# Patient Record
Sex: MALE | Race: WHITE | Employment: UNEMPLOYED | URBAN - METROPOLITAN AREA
[De-identification: names, ages, dates, MRNs, and addresses within clinical notes are randomized per-mention and may not be internally consistent; named-entity substitution may affect disease eponyms.]

---

## 2018-11-07 ENCOUNTER — HOSPITAL ENCOUNTER (OUTPATIENT)
Dept: ULTRASOUND IMAGING | Age: 48
Discharge: HOME | End: 2018-11-07
Payer: COMMERCIAL

## 2018-11-07 DIAGNOSIS — E04.1 NONTOXIC UNINODULAR GOITER: ICD-10-CM

## 2018-11-07 PROCEDURE — 76536 US EXAM OF HEAD AND NECK: CPT

## 2019-08-20 ENCOUNTER — HOSPITAL ENCOUNTER (OUTPATIENT)
Dept: MRI IMAGING | Age: 49
Discharge: HOME | End: 2019-08-20
Payer: COMMERCIAL

## 2019-08-20 DIAGNOSIS — Z98.890 HX OF CERVICAL SPINE SURGERY: ICD-10-CM

## 2019-08-20 DIAGNOSIS — M54.2 CERVICALGIA: ICD-10-CM

## 2019-08-20 DIAGNOSIS — M50.30 DDD (DEGENERATIVE DISC DISEASE), CERVICAL: ICD-10-CM

## 2019-08-20 PROCEDURE — 72141 MRI NECK SPINE W/O DYE: CPT

## 2019-12-17 ENCOUNTER — HOSPITAL ENCOUNTER (OUTPATIENT)
Dept: MRI IMAGING | Age: 49
Discharge: HOME | End: 2019-12-17
Payer: COMMERCIAL

## 2019-12-17 DIAGNOSIS — R51.9 CHRONIC DAILY HEADACHE: ICD-10-CM

## 2019-12-17 PROCEDURE — 70553 MRI BRAIN STEM W/O & W/DYE: CPT

## 2019-12-17 RX ORDER — GADOTERATE MEGLUMINE 376.9 MG/ML
18 INJECTION INTRAVENOUS
Status: COMPLETED | OUTPATIENT
Start: 2019-12-17 | End: 2019-12-17

## 2019-12-17 RX ADMIN — GADOTERATE MEGLUMINE 18 ML: 376.9 INJECTION INTRAVENOUS at 14:00

## 2020-01-07 ENCOUNTER — OFFICE VISIT (OUTPATIENT)
Dept: NEUROLOGY | Age: 50
End: 2020-01-07

## 2020-01-07 VITALS
HEIGHT: 70 IN | HEART RATE: 72 BPM | WEIGHT: 196 LBS | DIASTOLIC BLOOD PRESSURE: 78 MMHG | BODY MASS INDEX: 28.06 KG/M2 | RESPIRATION RATE: 12 BRPM | SYSTOLIC BLOOD PRESSURE: 110 MMHG | OXYGEN SATURATION: 97 %

## 2020-01-07 DIAGNOSIS — G44.86 CERVICOGENIC HEADACHE: ICD-10-CM

## 2020-01-07 DIAGNOSIS — G44.209 MIXED COMMON MIGRAINE AND MUSCLE CONTRACTION HEADACHE: ICD-10-CM

## 2020-01-07 DIAGNOSIS — I67.89 CEREBRAL MICROVASCULAR DISEASE: ICD-10-CM

## 2020-01-07 DIAGNOSIS — I65.23 BILATERAL CAROTID ARTERY STENOSIS: ICD-10-CM

## 2020-01-07 DIAGNOSIS — G43.009 MIXED COMMON MIGRAINE AND MUSCLE CONTRACTION HEADACHE: ICD-10-CM

## 2020-01-07 DIAGNOSIS — M96.1 CERVICAL POST-LAMINECTOMY SYNDROME: Primary | ICD-10-CM

## 2020-01-07 RX ORDER — PROPRANOLOL HYDROCHLORIDE 60 MG/1
60 CAPSULE, EXTENDED RELEASE ORAL DAILY
COMMUNITY
Start: 2019-08-06 | End: 2022-06-07 | Stop reason: ALTCHOICE

## 2020-01-07 RX ORDER — ASPIRIN 81 MG/1
TABLET ORAL DAILY
COMMUNITY
End: 2021-08-11

## 2020-01-07 RX ORDER — TOPIRAMATE 25 MG/1
25 TABLET ORAL 2 TIMES DAILY
COMMUNITY
Start: 2019-11-11 | End: 2020-01-07 | Stop reason: ALTCHOICE

## 2020-01-07 RX ORDER — RIZATRIPTAN BENZOATE 10 MG/1
10 TABLET ORAL
Qty: 12 TAB | Refills: 11 | Status: SHIPPED | OUTPATIENT
Start: 2020-01-07 | End: 2022-04-05 | Stop reason: SDUPTHER

## 2020-01-07 RX ORDER — SIMVASTATIN 10 MG/1
10 TABLET, FILM COATED ORAL
COMMUNITY
Start: 2019-12-30

## 2020-01-07 RX ORDER — LANOLIN ALCOHOL/MO/W.PET/CERES
400 CREAM (GRAM) TOPICAL DAILY
COMMUNITY
End: 2021-08-11

## 2020-01-07 RX ORDER — DULOXETIN HYDROCHLORIDE 30 MG/1
30 CAPSULE, DELAYED RELEASE ORAL DAILY
COMMUNITY
Start: 2019-11-10 | End: 2021-08-11

## 2020-01-07 RX ORDER — CHOLECALCIFEROL (VITAMIN D3) 125 MCG
1500 CAPSULE ORAL DAILY
COMMUNITY

## 2020-01-07 NOTE — LETTER
1/7/20 Patient: Amanda Becker YOB: 1970 Date of Visit: 1/7/2020 Addie Nicole MD 
[address] [place] [address] VIA Facsimile: [phone] Dear Addie Nicole MD, Thank you for referring Mr. Sarah Pate to [facility] for evaluation. My notes for this consultation are attached. Consult REFERRED BY: 
Trey Almeida MD 
 
CHIEF COMPLAINT: Progressive headaches since early summer last year Subjective:  
 
Amanda Becker is a 52 y.o. male seen as a new patient to me, at the request of Dr. Jake De Paz for evaluation of new problem of progressive and severe headaches since early summer of this year, that were bitemporal, described as pressure headaches, associate with occasional nausea but not much vomiting, and no other focal neurologic signs but marked tightness in his neck, and the headaches seem to be more in the posterior head regions and in the bitemporal and frontal head regions. They are described as a pressure sensation that occasionally became throbbing but more severe. He was placed on Inderal 60 mg a day which seems to have significantly helped his headaches and also placed on Topamax 25 mg to times a day that also seems to help and his headaches are much better now and they are relieved by his medications of Maxalt which he takes on a as needed basis, but does complain about the sublingual form and the bitter taste in his mouth, so we switched him over to regular pill form to avoid that. Patient had an MRI scan done December 2019 that was basically normal aside from minimal white matter disease, and because of that we will get a carotid Doppler study to make sure there is no vascular insufficiency, but on review the MRI scan which I did personally myself, there are only 1 or 2 spots that look like typical things we see with vascular headaches.   We will switch him from Topamax 25 mg twice a day to Trokendi 50 mg once a day since he is tolerating the medication well wants to simplify his medications. He also uses magnesium 400 mg a day which can work as a headache preventative, and also takes vitamin D 2000 units a day which may help some also. He takes fish oil and super B complex and multivitamin every day and takes Zocor 10 mg a day for his cholesterol, and Cymbalta already 30 mg a day which may also help with his headaches. He looks a little stressed and chronically depressed. He has had no fever, no meningismus, no trauma, but does have a lot of stress. He has had 3 cervical fusions and cervical laminectomies in the past a lot of his headaches begin in the neck region, I suggested he do a range of motion to his neck to help keep the muscles loose, and he probably has cervicogenic headaches. His last fusion was in 2016. Past Medical History:  
Diagnosis Date  Back pain  Hypercholesteremia  Migraine 2009  Neck pain Past Surgical History:  
Procedure Laterality Date  HX APPENDECTOMY  1990  
 HX CERVICAL LAMINECTOMY  HX CERVICAL LAMINECTOMY  2016  HX ORTHOPAEDIC Neck and back surgeries: 2008, 2014, 2016  HX TUMOR REMOVAL  2003 Fatty tumors Family History Problem Relation Age of Onset  Cancer Mother   
     uterine  Other Father ALS  Stroke Maternal Grandfather  Cancer Maternal Grandfather  Dementia Paternal Grandmother  Dementia Paternal Grandfather  Cancer Maternal Grandmother   
     breast  
  
Social History Tobacco Use  Smoking status: Never Smoker  Smokeless tobacco: Never Used Substance Use Topics  Alcohol use: Not Currently Current Outpatient Medications:  
  multivit with min-folic acid (ADULT MULTIVITAMIN GUMMIES) 200 mcg chew, Take  by mouth daily. , Disp: , Rfl:  
  propranolol LA (INDERAL LA) 60 mg SR capsule, TAKE 1 CAPSULE BY MOUTH EVERY DAY, Disp: , Rfl:  
  DULoxetine (CYMBALTA) 30 mg capsule, 30 mg daily. , Disp: , Rfl:  
  simvastatin (ZOCOR) 10 mg tablet, 10 mg nightly., Disp: , Rfl:  
  omega 3-dha-epa-fish oil 250-350-1,000 mg cap, Take 2 Caps by mouth daily. , Disp: , Rfl:  
  magnesium oxide (MAG-OX) 400 mg tablet, Take 400 mg by mouth daily. , Disp: , Rfl:  
  cholecalciferol, vitamin D3, (VITAMIN D3) 2,000 unit tab, Take  by mouth daily. , Disp: , Rfl:  
  topiramate ER (TROKENDI XR) 50 mg capsule, Take 1 Cap by mouth daily. , Disp: 90 Cap, Rfl: 4   rizatriptan (MAXALT) 10 mg tablet, Take 1 Tab by mouth every eight (8) hours as needed for Migraine. May repeat in 2 hours if needed, Disp: 12 Tab, Rfl: 11 
  aspirin delayed-release 81 mg tablet, Take  by mouth daily. , Disp: , Rfl:  
 
 
 
Allergies Allergen Reactions  Contrast Dye [Iodine] Hives MRI Results (most recent): 
Results from Hospital Encounter encounter on 12/17/19 MRI BRAIN W WO CONT Narrative EXAM:  MRI BRAIN W WO CONT INDICATION:    Chronic daily headache COMPARISON:  None. CONTRAST: 18 ml Dotarem. TECHNIQUE:   
Multiplanar multisequence acquisition without and with contrast of the brain. FINDINGS: 
The ventricles are normal in size and position. Minimal (less than 5) scattered 
nonspecific T2/FLAIR white matter hyperintensities, of doubtful clinical 
significance. The brain parenchyma otherwise has normal signal characteristics. There is no acute infarct, hemorrhage, extra-axial fluid collection, or mass 
effect. There is no cerebellar tonsillar herniation. Expected arterial 
flow-voids are present. No evidence of abnormal enhancement. Small retention cyst in the right maxillary sinus. The remaining paranasal 
sinuses, mastoid air cells and middle ears are clear. The orbital contents are 
within normal limits. No significant osseous or scalp lesions are identified.  
  
 Impression IMPRESSION:  
 
 1. No acute or significant intracranial abnormality. Results from Hospital Encounter encounter on 12/17/19 MRI BRAIN W WO CONT Narrative EXAM:  MRI BRAIN W WO CONT INDICATION:    Chronic daily headache COMPARISON:  None. CONTRAST: 18 ml Dotarem. TECHNIQUE:   
Multiplanar multisequence acquisition without and with contrast of the brain. FINDINGS: 
The ventricles are normal in size and position. Minimal (less than 5) scattered 
nonspecific T2/FLAIR white matter hyperintensities, of doubtful clinical 
significance. The brain parenchyma otherwise has normal signal characteristics. There is no acute infarct, hemorrhage, extra-axial fluid collection, or mass 
effect. There is no cerebellar tonsillar herniation. Expected arterial 
flow-voids are present. No evidence of abnormal enhancement. Small retention cyst in the right maxillary sinus. The remaining paranasal 
sinuses, mastoid air cells and middle ears are clear. The orbital contents are 
within normal limits. No significant osseous or scalp lesions are identified. Impression IMPRESSION:  
 
1. No acute or significant intracranial abnormality. Review of Systems: A comprehensive review of systems was negative except for: Musculoskeletal: positive for myalgias, arthralgias, stiff joints and neck pain Neurological: positive for headaches Behvioral/Psych: positive for anxiety and depression Vitals:  
 01/07/20 4710 BP: 110/78 Pulse: 72 Resp: 12 SpO2: 97% Weight: 196 lb (88.9 kg) Height: 5' 10\" (1.778 m) Objective: I 
 
 
NEUROLOGICAL EXAM: 
 
Appearance: The patient is well developed, well nourished, provides a coherent history and is in no acute distress. Patient has a well-healed anterior cervical laminectomy scar Mental Status: Oriented to time, place and person, and the president, cognitive function is normal and speech is fluent and no aphasia or dysarthria. Mood and affect appropriate but appears mildly depressed. Cranial Nerves:   Intact visual fields. Fundi are benign, disc are flat, no lesions seen on funduscopy. DANNI, EOM's full, no nystagmus, no ptosis. Facial sensation is normal. Corneal reflexes are not tested. Facial movement is symmetric. Hearing is normal bilaterally. Palate is midline with normal sternocleidomastoid and trapezius muscles are normal. Tongue is midline. Neck without meningismus or bruits, but does show very mild limitation of movement from his neck fusion Temporal arteries are not tender or enlarged TMJ areas are not tender on palpation Motor:  5/5 strength in upper and lower proximal and distal muscles. Normal bulk and tone. No fasciculations. Rapid alternating movement is symmetric and intact bilaterally Reflexes:   Deep tendon reflexes 2+/4 and symmetrical. 
No babinski or clonus present Sensory:   Normal to touch, pinprick and vibration and temperature. DSS is intact Gait:  Normal gait for patient's age. Tremor:   No tremor noted. Cerebellar:  No abnormal cerebellar signs present on Romberg and tandem testing and finger-nose-finger exam.  
Neurovascular:  Normal heart sounds and regular rhythm, peripheral pulses intact, and no carotid bruits. Assessment: ICD-10-CM ICD-9-CM 1. Cervical post-laminectomy syndrome M96.1 722.81 topiramate ER (TROKENDI XR) 50 mg capsule  
   rizatriptan (MAXALT) 10 mg tablet DUPLEX CAROTID BILATERAL 2. Cervicogenic headache R51 784.0 topiramate ER (TROKENDI XR) 50 mg capsule  
   rizatriptan (MAXALT) 10 mg tablet DUPLEX CAROTID BILATERAL 3. 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  Cerebral microvascular disease I67.9 437.9 DUPLEX CAROTID BILATERAL  
 5. Bilateral carotid artery stenosis I65.23 433.10 DUPLEX CAROTID BILATERAL  
  433.30 Active Problems: * No active hospital problems. * 
 
 
Plan:  
 
Patient's records, and MRI scans of the brain and cervical spine all personally reviewed on the PACS system myself, with the patient, and we went over these in detail with the patient, and I explained that most likely the white matter lesions in the brain secondary to his headaches, and that his cervical spine actually looks fairly good now adequately decompressed. Patient with cervicogenic headaches and probable muscle tension headaches, he is doing better on current beta-blocker 60 mg a day and he is to continue that, and continue his Topamax but in the form of Trokendi 50 mg once a day, and continue his Cymbalta 30 mg a day and his Maxalt was changed from sublingual form to pill form to avoid the bad taste. Patient is exam is relatively unremarkable except for his neck problem, and he had an MRI scan done in August 2019 that showed a mild C5-6 joint hypertrophy possibly affecting the right C6 nerve root with a fusion from C4-C7 but no real cord signal abnormality. Again his MRI of the brain in December 2019 just showed the minimal white matter disease he will get a Doppler for that. He is also encouraged take a baby aspirin every day which may help prevent some of the headaches and prevent any further white matter disease. We will check him again in 6 months time or earlier if need be. He will call if any problem in the interim He is also encouraged remain mentally and physically active, and continue his magnesium and regular exercise program to his neck. Signed By: Disha Bragg MD   
 January 7, 2020 CC: Emely Cleveland MD 
FAX: [phone] If you have questions, please do not hesitate to call me. I look forward to following your patient along with you. Sincerely, Disha Bragg MD

## 2020-01-07 NOTE — PATIENT INSTRUCTIONS
A Healthy Lifestyle: Care Instructions  Your Care Instructions    A healthy lifestyle can help you feel good, stay at a healthy weight, and have plenty of energy for both work and play. A healthy lifestyle is something you can share with your whole family. A healthy lifestyle also can lower your risk for serious health problems, such as high blood pressure, heart disease, and diabetes. You can follow a few steps listed below to improve your health and the health of your family. Follow-up care is a key part of your treatment and safety. Be sure to make and go to all appointments, and call your doctor if you are having problems. It's also a good idea to know your test results and keep a list of the medicines you take. How can you care for yourself at home? · Do not eat too much sugar, fat, or fast foods. You can still have dessert and treats now and then. The goal is moderation. · Start small to improve your eating habits. Pay attention to portion sizes, drink less juice and soda pop, and eat more fruits and vegetables. ? Eat a healthy amount of food. A 3-ounce serving of meat, for example, is about the size of a deck of cards. Fill the rest of your plate with vegetables and whole grains. ? Limit the amount of soda and sports drinks you have every day. Drink more water when you are thirsty. ? Eat at least 5 servings of fruits and vegetables every day. It may seem like a lot, but it is not hard to reach this goal. A serving or helping is 1 piece of fruit, 1 cup of vegetables, or 2 cups of leafy, raw vegetables. Have an apple or some carrot sticks as an afternoon snack instead of a candy bar. Try to have fruits and/or vegetables at every meal.  · Make exercise part of your daily routine. You may want to start with simple activities, such as walking, bicycling, or slow swimming. Try to be active 30 to 60 minutes every day. You do not need to do all 30 to 60 minutes all at once.  For example, you can exercise 3 times a day for 10 or 20 minutes. Moderate exercise is safe for most people, but it is always a good idea to talk to your doctor before starting an exercise program.  · Keep moving. Margy Limb the lawn, work in the garden, or [employer]. Take the stairs instead of the elevator at work. · If you smoke, quit. People who smoke have an increased risk for heart attack, stroke, cancer, and other lung illnesses. Quitting is hard, but there are ways to boost your chance of quitting tobacco for good. ? Use nicotine gum, patches, or lozenges. ? Ask your doctor about stop-smoking programs and medicines. ? Keep trying. In addition to reducing your risk of diseases in the future, you will notice some benefits soon after you stop using tobacco. If you have shortness of breath or asthma symptoms, they will likely get better within a few weeks after you quit. · Limit how much alcohol you drink. Moderate amounts of alcohol (up to 2 drinks a day for men, 1 drink a day for women) are okay. But drinking too much can lead to liver problems, high blood pressure, and other health problems. Family health  If you have a family, there are many things you can do together to improve your health. · Eat meals together as a family as often as possible. · Eat healthy foods. This includes fruits, vegetables, lean meats and dairy, and whole grains. · Include your family in your fitness plan. Most people think of activities such as jogging or tennis as the way to fitness, but there are many ways you and your family can be more active. Anything that makes you breathe hard and gets your heart pumping is exercise. Here are some tips:  ? Walk to do errands or to take your child to school or the bus.  ? Go for a family bike ride after dinner instead of watching TV. Where can you learn more? Go to http://daily-niki.info/. Enter F410 in the search box to learn more about \"A Healthy Lifestyle: Care Instructions. \"  Current as of: May 28, 2019  Content Version: 12.2  © 3866-2351 [employer]. Care instructions adapted under license by [employer] (which disclaims liability or warranty for this information). If you have questions about a medical condition or this instruction, always ask your healthcare professional. [address] any warranty or liability for your use of this information. Office Policies    o Phone calls/patient messages:  Please allow up to 24 hours for someone in the office to contact you about your call or message. Be mindful your provider may be out of the office or your message may require further review. We encourage you to use [employer] for your messages as this is a faster, more efficient way to communicate with our office    o Medication Refills:  Prescription medications require up to 48 business hours to process. We encourage you to use [employer] for your refills. For controlled medications: Please allow up to 72 business hours to process. Certain medications may require you to  a written prescription at our office. NO narcotic/controlled medications will be prescribed after 4pm Monday through Friday or on weekends    o Form/Paperwork Completion:  We ask that you allow 7-14 business days. You may also download your forms to [employer] to have your doctor print off.

## 2020-01-08 NOTE — PROGRESS NOTES
Consult  REFERRED BY:  Mariza Rodriguez MD    CHIEF COMPLAINT: Progressive headaches since early summer last year      Subjective:     Aliyah Mello is a 52 y.o. male seen as a new patient to me, at the request of Dr. Alida John for evaluation of new problem of progressive and severe headaches since early summer of this year, that were bitemporal, described as pressure headaches, associate with occasional nausea but not much vomiting, and no other focal neurologic signs but marked tightness in his neck, and the headaches seem to be more in the posterior head regions and in the bitemporal and frontal head regions. They are described as a pressure sensation that occasionally became throbbing but more severe. He was placed on Inderal 60 mg a day which seems to have significantly helped his headaches and also placed on Topamax 25 mg to times a day that also seems to help and his headaches are much better now and they are relieved by his medications of Maxalt which he takes on a as needed basis, but does complain about the sublingual form and the bitter taste in his mouth, so we switched him over to regular pill form to avoid that. Patient had an MRI scan done December 2019 that was basically normal aside from minimal white matter disease, and because of that we will get a carotid Doppler study to make sure there is no vascular insufficiency, but on review the MRI scan which I did personally myself, there are only 1 or 2 spots that look like typical things we see with vascular headaches. We will switch him from Topamax 25 mg twice a day to Trokendi 50 mg once a day since he is tolerating the medication well wants to simplify his medications. He also uses magnesium 400 mg a day which can work as a headache preventative, and also takes vitamin D 2000 units a day which may help some also.   He takes fish oil and super B complex and multivitamin every day and takes Zocor 10 mg a day for his cholesterol, and Cymbalta already 30 mg a day which may also help with his headaches. He looks a little stressed and chronically depressed. He has had no fever, no meningismus, no trauma, but does have a lot of stress. He has had 3 cervical fusions and cervical laminectomies in the past a lot of his headaches begin in the neck region, I suggested he do a range of motion to his neck to help keep the muscles loose, and he probably has cervicogenic headaches. His last fusion was in 2016. Past Medical History:   Diagnosis Date    Back pain     Hypercholesteremia     Migraine 2009    Neck pain       Past Surgical History:   Procedure Laterality Date    HX APPENDECTOMY  1990    HX CERVICAL LAMINECTOMY      HX CERVICAL LAMINECTOMY  2016    HX ORTHOPAEDIC      Neck and back surgeries: 2008, 2014, 2016    HX TUMOR REMOVAL  2003    Fatty tumors     Family History   Problem Relation Age of Onset    Cancer Mother         uterine    Other Father         ALS    Stroke Maternal Grandfather     Cancer Maternal Grandfather     Dementia Paternal Grandmother     Dementia Paternal Grandfather     Cancer Maternal Grandmother         breast      Social History     Tobacco Use    Smoking status: Never Smoker    Smokeless tobacco: Never Used   Substance Use Topics    Alcohol use: Not Currently         Current Outpatient Medications:     multivit with min-folic acid (ADULT MULTIVITAMIN GUMMIES) 200 mcg chew, Take  by mouth daily. , Disp: , Rfl:     propranolol LA (INDERAL LA) 60 mg SR capsule, TAKE 1 CAPSULE BY MOUTH EVERY DAY, Disp: , Rfl:     DULoxetine (CYMBALTA) 30 mg capsule, 30 mg daily. , Disp: , Rfl:     simvastatin (ZOCOR) 10 mg tablet, 10 mg nightly., Disp: , Rfl:     omega 3-dha-epa-fish oil 250-350-1,000 mg cap, Take 2 Caps by mouth daily. , Disp: , Rfl:     magnesium oxide (MAG-OX) 400 mg tablet, Take 400 mg by mouth daily. , Disp: , Rfl:     cholecalciferol, vitamin D3, (VITAMIN D3) 2,000 unit tab, Take  by mouth daily. , Disp: , Rfl:   topiramate ER (TROKENDI XR) 50 mg capsule, Take 1 Cap by mouth daily. , Disp: 90 Cap, Rfl: 4    rizatriptan (MAXALT) 10 mg tablet, Take 1 Tab by mouth every eight (8) hours as needed for Migraine. May repeat in 2 hours if needed, Disp: 12 Tab, Rfl: 11    aspirin delayed-release 81 mg tablet, Take  by mouth daily. , Disp: , Rfl:         Allergies   Allergen Reactions    Contrast Dye [Iodine] Hives      MRI Results (most recent):  Results from Hospital Encounter encounter on 12/17/19   MRI BRAIN W WO CONT    Narrative EXAM:  MRI BRAIN W WO CONT    INDICATION:    Chronic daily headache    COMPARISON:  None. CONTRAST: 18 ml Dotarem. TECHNIQUE:    Multiplanar multisequence acquisition without and with contrast of the brain. FINDINGS:  The ventricles are normal in size and position. Minimal (less than 5) scattered  nonspecific T2/FLAIR white matter hyperintensities, of doubtful clinical  significance. The brain parenchyma otherwise has normal signal characteristics. There is no acute infarct, hemorrhage, extra-axial fluid collection, or mass  effect. There is no cerebellar tonsillar herniation. Expected arterial  flow-voids are present. No evidence of abnormal enhancement. Small retention cyst in the right maxillary sinus. The remaining paranasal  sinuses, mastoid air cells and middle ears are clear. The orbital contents are  within normal limits. No significant osseous or scalp lesions are identified. Impression IMPRESSION:     1. No acute or significant intracranial abnormality. Results from [place] encounter on 12/17/19   MRI BRAIN W WO CONT    Narrative EXAM:  MRI BRAIN W WO CONT    INDICATION:    Chronic daily headache    COMPARISON:  None. CONTRAST: 18 ml Dotarem. TECHNIQUE:    Multiplanar multisequence acquisition without and with contrast of the brain. FINDINGS:  The ventricles are normal in size and position.  Minimal (less than 5) scattered  nonspecific T2/FLAIR white matter hyperintensities, of doubtful clinical  significance. The brain parenchyma otherwise has normal signal characteristics. There is no acute infarct, hemorrhage, extra-axial fluid collection, or mass  effect. There is no cerebellar tonsillar herniation. Expected arterial  flow-voids are present. No evidence of abnormal enhancement. Small retention cyst in the right maxillary sinus. The remaining paranasal  sinuses, mastoid air cells and middle ears are clear. The orbital contents are  within normal limits. No significant osseous or scalp lesions are identified. Impression IMPRESSION:     1. No acute or significant intracranial abnormality. Review of Systems:  A comprehensive review of systems was negative except for: Musculoskeletal: positive for myalgias, arthralgias, stiff joints and neck pain  Neurological: positive for headaches  Behvioral/Psych: positive for anxiety and depression   Vitals:    01/07/20 0856   BP: 110/78   Pulse: 72   Resp: 12   SpO2: 97%   Weight: 196 lb (88.9 kg)   Height: 5' 10\" (1.778 m)     Objective:     I      NEUROLOGICAL EXAM:    Appearance: The patient is well developed, well nourished, provides a coherent history and is in no acute distress. Patient has a well-healed anterior cervical laminectomy scar   Mental Status: Oriented to time, place and person, and the president, cognitive function is normal and speech is fluent and no aphasia or dysarthria. Mood and affect appropriate but appears mildly depressed. Cranial Nerves:   Intact visual fields. Fundi are benign, disc are flat, no lesions seen on funduscopy. DANNI, EOM's full, no nystagmus, no ptosis. Facial sensation is normal. Corneal reflexes are not tested. Facial movement is symmetric. Hearing is normal bilaterally. Palate is midline with normal sternocleidomastoid and trapezius muscles are normal. Tongue is midline.   Neck without meningismus or bruits, but does show very mild limitation of movement from his neck fusion  Temporal arteries are not tender or enlarged  TMJ areas are not tender on palpation   Motor:  5/5 strength in upper and lower proximal and distal muscles. Normal bulk and tone. No fasciculations. Rapid alternating movement is symmetric and intact bilaterally   Reflexes:   Deep tendon reflexes 2+/4 and symmetrical.  No babinski or clonus present   Sensory:   Normal to touch, pinprick and vibration and temperature. DSS is intact   Gait:  Normal gait for patient's age. Tremor:   No tremor noted. Cerebellar:  No abnormal cerebellar signs present on Romberg and tandem testing and finger-nose-finger exam.   Neurovascular:  Normal heart sounds and regular rhythm, peripheral pulses intact, and no carotid bruits. Assessment:       ICD-10-CM ICD-9-CM    1. Cervical post-laminectomy syndrome M96.1 722.81 topiramate ER (TROKENDI XR) 50 mg capsule      rizatriptan (MAXALT) 10 mg tablet      DUPLEX CAROTID BILATERAL   2. Cervicogenic headache R51 784.0 topiramate ER (TROKENDI XR) 50 mg capsule      rizatriptan (MAXALT) 10 mg tablet      DUPLEX CAROTID BILATERAL   3. 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 Cerebral microvascular disease I67.9 437.9 DUPLEX CAROTID BILATERAL   5. Bilateral carotid artery stenosis I65.23 433.10 DUPLEX CAROTID BILATERAL     433.30      Active Problems:    * No active hospital problems. *      Plan:     Patient's records, and MRI scans of the brain and cervical spine all personally reviewed on the PACS system myself, with the patient, and we went over these in detail with the patient, and I explained that most likely the white matter lesions in the brain secondary to his headaches, and that his cervical spine actually looks fairly good now adequately decompressed.   Patient with cervicogenic headaches and probable muscle tension headaches, he is doing better on current beta-blocker 60 mg a day and he is to continue that, and continue his Topamax but in the form of Trokendi 50 mg once a day, and continue his Cymbalta 30 mg a day and his Maxalt was changed from sublingual form to pill form to avoid the bad taste. Patient is exam is relatively unremarkable except for his neck problem, and he had an MRI scan done in August 2019 that showed a mild C5-6 joint hypertrophy possibly affecting the right C6 nerve root with a fusion from C4-C7 but no real cord signal abnormality. Again his MRI of the brain in December 2019 just showed the minimal white matter disease he will get a Doppler for that. He is also encouraged take a baby aspirin every day which may help prevent some of the headaches and prevent any further white matter disease. We will check him again in 6 months time or earlier if need be. He will call if any problem in the interim  He is also encouraged remain mentally and physically active, and continue his magnesium and regular exercise program to his neck.     Signed By: Bonita Spence MD     January 7, 2020       CC: Valentin Concepcion MD  FAX: [phone]

## 2020-07-14 ENCOUNTER — OFFICE VISIT (OUTPATIENT)
Dept: NEUROLOGY | Age: 50
End: 2020-07-14

## 2020-07-14 VITALS
HEIGHT: 70 IN | RESPIRATION RATE: 16 BRPM | BODY MASS INDEX: 28.55 KG/M2 | DIASTOLIC BLOOD PRESSURE: 60 MMHG | SYSTOLIC BLOOD PRESSURE: 118 MMHG | WEIGHT: 199.4 LBS | HEART RATE: 65 BPM | OXYGEN SATURATION: 100 % | TEMPERATURE: 98 F

## 2020-07-14 DIAGNOSIS — G43.009 MIXED COMMON MIGRAINE AND MUSCLE CONTRACTION HEADACHE: Primary | ICD-10-CM

## 2020-07-14 DIAGNOSIS — G44.86 CERVICOGENIC HEADACHE: ICD-10-CM

## 2020-07-14 DIAGNOSIS — M96.1 CERVICAL POST-LAMINECTOMY SYNDROME: ICD-10-CM

## 2020-07-14 DIAGNOSIS — G44.209 MIXED COMMON MIGRAINE AND MUSCLE CONTRACTION HEADACHE: Primary | ICD-10-CM

## 2020-07-14 RX ORDER — IBUPROFEN 800 MG/1
800 TABLET ORAL
Qty: 60 TAB | Refills: 0
Start: 2020-07-14

## 2020-07-14 NOTE — PATIENT INSTRUCTIONS
Increase Trokendi to total of 100 mg daily I have put in a new prescription for the 100 mg to your pharmacy but since she still have a great deal the 50 mg to take 2 together for total of 100 mg    For rescue medication: I want you to be more aggressive with it. Rizatriptan: You can take at the onset of a significant migraine and repeat every 2 hours to a maximum of 3 tablets in 24 hours but I do not want you to use it more than 2 days in a 7-day cycle    For the more mild to moderate headaches use the ibuprofen but if they escalate you can switch to the rizatriptan    Additionally with a headache and it is full-blown take the rizatriptan and 800mg motrin    Finally keep a headache log for me over the next 3 months so we can really see what your headache and migraines are doing what type of patterns are present.

## 2020-07-14 NOTE — PROGRESS NOTES
Aston Juarez is a 48 y.o. male who presents today for the following:  Chief Complaint   Patient presents with    Migraine    Neck Pain     radiating to the left wrist with a burning sensation times 3 weeks       This is an in office visit    HPI  Historical Data  Patient is previously been seen by Dr. Irene Edwards    Neurologic diagnosis:  Headaches and migraines   Location: Bitemporal, frontal in the back of his head   Quality: Pressure/throbbing   Associated symptoms: Nausea occasional vomiting    Preventative therapies tried  Propranolol  Topamax  Trokendi  Cymbalta    Rescue medications:  Maxalt  Ibuprofen 800 mg as needed    Other significant comorbid conditions  Cervical fusion x3       Interim Data:       Patient tells me he is still having frequent headaches and migraines. He can go for couple of weeks without any headache whatsoever and that he will get a flurry of headaches. When he gets his flurry he will actually have headaches pretty much every day for a while. He will use rescue of Maxalt or ibuprofen but only to a tolerable level he does not use it aggressively to abort the headache    His worse headaches with a full-blown once that he wakes up with which seem resistant to treatment. He generally will take a Maxalt but that usually is ineffective and later has to take an 800 mg ibuprofen and that takes a while to kick in      He is tolerating the Trokendi 50 mg without any difficulties he thinks it is been beneficial but cannot really articulate how or why    He feels his headaches are all related to his cervical spine issues  As they started when his issues related to his spine started      Allergies   Allergen Reactions    Contrast Dye [Iodine] Hives       Current Outpatient Medications   Medication Sig    multivit with min-folic acid (ADULT MULTIVITAMIN GUMMIES) 200 mcg chew Take  by mouth daily.     propranolol LA (INDERAL LA) 60 mg SR capsule TAKE 1 CAPSULE BY MOUTH EVERY DAY    DULoxetine (CYMBALTA) 30 mg capsule 30 mg daily.  simvastatin (ZOCOR) 10 mg tablet 10 mg nightly.  omega 3-dha-epa-fish oil 250-350-1,000 mg cap Take 2 Caps by mouth daily.  magnesium oxide (MAG-OX) 400 mg tablet Take 400 mg by mouth daily.  cholecalciferol, vitamin D3, (VITAMIN D3) 2,000 unit tab Take  by mouth daily.  topiramate ER (TROKENDI XR) 50 mg capsule Take 1 Cap by mouth daily.  rizatriptan (MAXALT) 10 mg tablet Take 1 Tab by mouth every eight (8) hours as needed for Migraine. May repeat in 2 hours if needed    aspirin delayed-release 81 mg tablet Take  by mouth daily. No current facility-administered medications for this visit.         Past Medical History:   Diagnosis Date    Back pain     Hypercholesteremia     Migraine 2009    Neck pain        Past Surgical History:   Procedure Laterality Date    HX APPENDECTOMY  1990    HX CERVICAL LAMINECTOMY      HX CERVICAL LAMINECTOMY  2016    HX ORTHOPAEDIC      Neck and back surgeries: 2008, 2014, 2016    HX TUMOR REMOVAL  2003    Fatty tumors       Family History   Problem Relation Age of Onset    Cancer Mother         uterine    Other Father         ALS    Stroke Maternal Grandfather     Cancer Maternal Grandfather     Dementia Paternal Grandmother     Dementia Paternal Grandfather     Cancer Maternal Grandmother         breast       Social History     Socioeconomic History    Marital status:      Spouse name: Not on file    Number of children: Not on file    Years of education: Not on file    Highest education level: Not on file   Tobacco Use    Smoking status: Never Smoker    Smokeless tobacco: Never Used   Substance and Sexual Activity    Alcohol use: Not Currently    Drug use: Never    Sexual activity: Yes         ROS  Other than what is stated above under HPI there is no new or changing issues related to the following:  Constitutional: No recent weight change, fever,fatigue, sleep difficulties, or loss of appetite. ENT/Mouth:  No hearing loss, ringing in the ears, chronic sinus problem, nose bleeds sore throat, voice change, hoarseness, swollen glands in neck, or difficulties with chewing and swallowing. Cardiovascular:  No chest pain/angina pectoris, palpitations,swelling of feet/ankles/hands, or calf pain while walking. Respiratory: No chronic or frequent coughs, spitting up blood, shortness of breath, asthma, or wheezing. Gastrointestinal: No abdominal pain, heartburn, nausea, vomiting, constipation, frequent diarrhea, rectal bleeding, or blood in stool. Genitourinary: No frequent urination, burning or painful urination, blood in urine, incontinence or dribbling. Musculoskeletal:   No joint pain, stiffness/swelling, weakness of muscles, muscle pain/cramp, or back pain. Integument:   No rash/itching, change in skin color, change in hair/nails, or change in color/size of moles. Neurological:  No dizziness/vertigo, loss of consciousness, numbness/tingling sensation, tremors, weakness in limbs, difficulty with balance, frequent or recurring headaches, memory loss or confusion. Psychiatric:   No nervousness, depression, hallucinations, paranoia or suspiciousness. Endocrine: No excessive thirst or urination, heat or cold intolerance. Hematologic/Lymphatic: No bleeding/bruising tendency, phlebitis, or past transfusion. EXAMINATION  Visit Vitals  /60   Pulse 65   Temp 98 °F (36.7 °C) (Temporal)   Resp 16   Ht 5' 10\" (1.778 m)   Wt 199 lb 6.4 oz (90.4 kg)   SpO2 100%   BMI 28.61 kg/m²            General appearance: Patient is well-developed and well-nourished in no apparent distress and well groomed.     Psych/mental health:  Affect: Appropriate    PHQ  3 most recent PHQ Screens 7/14/2020   PHQ Not Done Active Diagnosis of Depression or Bipolar Disorder   Little interest or pleasure in doing things -   Feeling down, depressed, irritable, or hopeless -   Total Score PHQ 2 -       HEENT: Normocephalic, without evidence of trauma  Full range of motion, no tenderness to palpation in the head or neck region     Cardiovascular: Extremities warm to touch, no swelling appreciated    Respiratory: No dyspnea on exertion noted, normal effort on casual observation    Musculoskeletal: No evidence of significant bony deformities or spinal curvature    Integumentary:  No obvious bruising, lacerations or discoloaration on casual observation. Neurological Examination:   Mental Status:        MMSE  No flowsheet data found. Formal testing was not completed    there was nothing concerning on general observation and discussion.    Alert oriented and appropriate to general conversation  Normal processing on general observation  Followed conversation and responded seemingly appropriate throughout the office visit  No word finding difficulties noted on casual observation  Able to follow directions without difficulty     Cranial Nerves:    PERRLA,   EOMs intact gaze is conjugate  No nystagmus is appreciated  No ARVIN  Facial motor and sensory intact bilaterally  Hearing intact to conversation  Voice with normal projection, no evidence of secretion pooling  Palate elevates symmetrically  Shoulder shrug intact bilaterally  No tongue deviation appreciated     Motor:   Normal tone and bulk  Somewhat poor posture he sits with his back curved and rounded shoulders   fine dexterity intact bilaterally  No tremor appreciated on today's exam  No abnormal movements appreciated on today's exam    Muscle strength testing:  Right side  Upper extremities  Deltoid 5/5  Bicep 5/5  Tricep 5/5  Hand grasp 5/5    Lower extremity  Hip flexor 5/5  Extensor 5/5  Flexor 5/5  Plantar flexion 5/5  Dorsiflexion 5/5      Left side  Deltoid 5/5  Bicep 5/5  Tricep 5/5  Hand grasp 5/5    Lower extremity  Hip flexor 5/5  Extensor 5/5  Flexor 5/5  Plantar flexion 5/5  Dorsiflexion 5/5    Sensation: Intact to light touch bilaterally    Coordination/Cerebellar:   Grossly intact    Gait: Ambulates independently    Fall risk assessment  No flowsheet data found. Reflexes: Not tested    ASSESSMENT AND PLAN  Chronic refractory headaches and migraines:  Probably underdosed on the Trokendi were can increase that to 100 mg daily    He also needs to be more aggressive with his abortive protocol because I think we are in reoccurrence headache and migraine once they start and then ultimately that could lead to rebounding headaches    For mild to moderate headaches he can use the ibuprofen  For moderate to severe headaches he is to use the rizatriptan at onset and can repeat every 2 hours not to exceed 3 tablets in 24 hours and use it 2 days per 7-day cycle  For the full-blown headaches that he wakes up with I want him to take rizatriptan and an 800 mg tablet to break the headache    He is also been requested to keep a headache log and bring it with him at follow-up visit in 3 months    ICD-10-CM ICD-9-CM    1. 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 Cervicogenic headache  R51 784.0    3. Cervical post-laminectomy syndrome  M96.1 722.81            Additional pertinent medical data reviewed at today's office visit:         No visits with results within 3 Month(s) from this visit. Latest known visit with results is:   No results found for any previous visit. XR Results (maximum last 3): No results found for this or any previous visit. CT Results (maximum last 3): No results found for this or any previous visit. MRI Results (maximum last 3): Results from [place] encounter on 12/17/19   MRI BRAIN W WO CONT    Impression IMPRESSION:     1. No acute or significant intracranial abnormality. Results from [place] encounter on 08/20/19   MRI CERV SPINE WO CONT    Impression IMPRESSION:    1.  C5-C6 uncovertebral joint hypertrophy may affect the right C6 nerve.  2. 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 3. C3-C4 mild central spinal canal stenosis. 4. Normal cervical spinal cord. 5. Left thyroid lobe hypertrophy. See ultrasound thyroid report from last year. Results from [place] encounter on 09/29/16   MRI LUMB SPINE WO CONT    Impression Impression:  Severe degenerative disc disease at L5-S1. Remote left laminotomy at L5-S1. Small disc bulge with superimposed small central disc protrusion at this level. Mildly effaced left lateral recess. No neuroforaminal narrowing or spinal canal  stenosis.                   Jian Roberson, MS, ANP-BC, [facility]

## 2020-10-13 ENCOUNTER — OFFICE VISIT (OUTPATIENT)
Dept: NEUROLOGY | Age: 50
End: 2020-10-13
Payer: COMMERCIAL

## 2020-10-13 VITALS
TEMPERATURE: 97.5 F | HEART RATE: 72 BPM | SYSTOLIC BLOOD PRESSURE: 118 MMHG | OXYGEN SATURATION: 99 % | BODY MASS INDEX: 28.63 KG/M2 | HEIGHT: 70 IN | RESPIRATION RATE: 18 BRPM | WEIGHT: 200 LBS | DIASTOLIC BLOOD PRESSURE: 66 MMHG

## 2020-10-13 DIAGNOSIS — M96.1 CERVICAL POST-LAMINECTOMY SYNDROME: ICD-10-CM

## 2020-10-13 DIAGNOSIS — G62.9 NEUROPATHY: Primary | ICD-10-CM

## 2020-10-13 DIAGNOSIS — G44.86 CERVICOGENIC HEADACHE: ICD-10-CM

## 2020-10-13 PROCEDURE — 99215 OFFICE O/P EST HI 40 MIN: CPT | Performed by: PSYCHIATRY & NEUROLOGY

## 2020-10-13 RX ORDER — ASPIRIN 325 MG
TABLET ORAL
COMMUNITY
End: 2020-10-13

## 2020-10-13 RX ORDER — PREGABALIN 75 MG/1
75 CAPSULE ORAL 2 TIMES DAILY
COMMUNITY
Start: 2020-09-16 | End: 2020-10-16

## 2020-10-13 NOTE — PATIENT INSTRUCTIONS
As we talked about for this severe morning headaches taking Maxalt + ibuprofen and then if your headache is not completely gone you can repeat the Maxalt again in 2 hours you can take a total of 3 Maxalt in 24 hours Otherwise continue using the ibuprofen more mild headaches and the Maxalt for more severe headaches Continue on Trokendi 100 mg once a day and that is probably causing a little bit of numbness in your hands and feet Working to do some blood work to see if there is anything else affecting your nerves that could contribute to your sense of imbalance But there is nothing significant on your MRI scans of your brain cervical spine or lumbar spine that would directly contribute to your sense of imbalance but certainly neuropathy related to prior problems resulting in surgeries can contribute to the imbalance At this point we will see you back in 6 months sooner if there are problems issues or concerns

## 2020-10-13 NOTE — PROGRESS NOTES
Tony Cervantes is a 48 y.o. male  Chief Complaint   Patient presents with    Follow-up     2 month      Health Maintenance Due   Topic Date Due    Lipid Screen  03/16/1980    Shingrix Vaccine Age 50> (1 of 2) 03/16/2020    FOBT Q1Y Age 50-75  03/16/2020    Flu Vaccine (1) 09/01/2020     Visit Vitals  /66 (BP 1 Location: Left arm, BP Patient Position: Sitting)   Pulse 72   Temp 97.5 °F (36.4 °C) (Temporal)   Resp 18   Ht 5' 10\" (1.778 m)   Wt 200 lb (90.7 kg)   SpO2 99%   BMI 28.70 kg/m²     1. Have you been to the ER, urgent care clinic since your last visit? Hospitalized since your last visit? No     2. Have you seen or consulted any other health care providers outside of the [address] [place] [address] since your last visit? Include any pap smears or colon screening. Yes, Pain management at [facility]    Pt reports a fall in the middle of the night on the way to the bathroom, about 2 weeks ago.

## 2020-10-13 NOTE — PROGRESS NOTES
Moraima May is a 48 y.o. male who presents today for the following:  Chief Complaint   Patient presents with    Follow-up     2 month        This is an in office visit    HPI  Historical Data  Patient is previously been seen by Dr. Arabella Huizar    Neurologic diagnosis:  Headaches and migraines   Location: Bitemporal, frontal in the back of his head   Quality: Pressure/throbbing   Associated symptoms: Nausea occasional vomiting    Preventative therapies tried  Propranolol  Topamax  Trokendi  Cymbalta    Rescue medications:  Maxalt  Ibuprofen 800 mg as needed    Duplex studies completed January 9, 2020: 0% stenosis bilateral carotid artery and normal vertebral anterograde flow    MRI of the brain from December 19, 2020 shows minimal [less than 5] scattered nonspecific T2 flair white matter hyper intensities felt to be of no clinical significance otherwise unremarkable    Other significant comorbid conditions  Cervical fusion x3       Interim Data:     Headaches and migraines  To date this month he has had 1 migraine  September he had 7 migraines  August she had 9 migraines  July he had 7 migraines    Currently he is on Trokendi 100 mg daily  Tolerating well denies side effects  For rescue medicine he uses Maxalt for the more severe migraines but he tends to limit utilization of this due to the limit of 9/month  Additionally he uses ibuprofen 800 mg as needed    His works headaches still seem to be those morning headaches when he wakes up from sleep without full-blown  But in general he tells me that the Maxalt and/or the ibuprofen is effective in aborting his headaches  Is no longer losing significant time in his life related to headache and migraine  They are not lasting more than 4 hours  He is not incapacitated    He feels his headaches are all related to his cervical spine issues  As they started when his issues related to his spine started    Recent fall  Patient states he had gotten up in the middle of the night to go to the bathroom and coming back to his bed he just stumbled and kind of slid on the edge of his bed onto the floor  There were no real injuries  He denies symptoms related to feeling lightheaded, dizzy, presyncopal he denies blacking out  He denies any chest pain shortness of breath diaphoresis acute visual changes hearing loss tinnitus speech swallowing difficulties focal paresthesias or weakness    He does claim that he has chronic issues related to balance  He often stumbles    Allergies   Allergen Reactions    Contrast Dye [Iodine] Hives       Current Outpatient Medications   Medication Sig    pregabalin (LYRICA) 75 mg capsule Take 75 mg by mouth two (2) times a day.  ibuprofen (MOTRIN) 800 mg tablet Take 1 Tab by mouth every six (6) hours as needed for Pain.  topiramate ER (Trokendi XR) 100 mg capsule Take 1 Cap by mouth daily. Indications: migraine prevention    multivit with min-folic acid (ADULT MULTIVITAMIN GUMMIES) 200 mcg chew Take  by mouth daily.  propranolol LA (INDERAL LA) 60 mg SR capsule TAKE 1 CAPSULE BY MOUTH EVERY DAY    DULoxetine (CYMBALTA) 30 mg capsule 30 mg daily.  simvastatin (ZOCOR) 10 mg tablet 10 mg nightly.  omega 3-dha-epa-fish oil 250-350-1,000 mg cap Take 2 Caps by mouth daily.  magnesium oxide (MAG-OX) 400 mg tablet Take 400 mg by mouth daily.  cholecalciferol, vitamin D3, (VITAMIN D3) 2,000 unit tab Take  by mouth daily.  rizatriptan (MAXALT) 10 mg tablet Take 1 Tab by mouth every eight (8) hours as needed for Migraine. May repeat in 2 hours if needed    aspirin delayed-release 81 mg tablet Take  by mouth daily. No current facility-administered medications for this visit.         Past Medical History:   Diagnosis Date    Back pain     Hypercholesteremia     Migraine 2009    Neck pain        Past Surgical History:   Procedure Laterality Date    HX APPENDECTOMY  1990    HX CERVICAL LAMINECTOMY      HX CERVICAL LAMINECTOMY  2016    HX ORTHOPAEDIC      Neck and back surgeries: 2008, 2014, 2016    HX TUMOR REMOVAL  2003    Fatty tumors       Family History   Problem Relation Age of Onset    Cancer Mother         uterine    Other Father         ALS    Stroke Maternal Grandfather     Cancer Maternal Grandfather     Dementia Paternal Grandmother     Dementia Paternal Grandfather     Cancer Maternal Grandmother         breast       Social History     Socioeconomic History    Marital status:      Spouse name: Not on file    Number of children: Not on file    Years of education: Not on file    Highest education level: Not on file   Tobacco Use    Smoking status: Never Smoker    Smokeless tobacco: Never Used   Substance and Sexual Activity    Alcohol use: Not Currently    Drug use: Never    Sexual activity: Yes         ROS  Other than what is stated above under HPI there is no new or changing issues related to the following:  Constitutional: No recent weight change, fever,fatigue, sleep difficulties, or loss of appetite. ENT/Mouth:  No hearing loss, ringing in the ears, chronic sinus problem, nose bleeds sore throat, voice change, hoarseness, swollen glands in neck, or difficulties with chewing and swallowing. Cardiovascular:  No chest pain/angina pectoris, palpitations,swelling of feet/ankles/hands, or calf pain while walking. Respiratory: No chronic or frequent coughs, spitting up blood, shortness of breath, asthma, or wheezing. Gastrointestinal: No abdominal pain, heartburn, nausea, vomiting, constipation, frequent diarrhea, rectal bleeding, or blood in stool. Genitourinary: No frequent urination, burning or painful urination, blood in urine, incontinence or dribbling. Musculoskeletal:   No joint pain, stiffness/swelling, weakness of muscles, muscle pain/cramp, or back pain. Integument:   No rash/itching, change in skin color, change in hair/nails, or change in color/size of moles.    Neurological:  No dizziness/vertigo, loss of consciousness, numbness/tingling sensation, tremors, weakness in limbs, difficulty with balance, frequent or recurring headaches, memory loss or confusion. Psychiatric:   No nervousness, depression, hallucinations, paranoia or suspiciousness. Endocrine: No excessive thirst or urination, heat or cold intolerance. Hematologic/Lymphatic: No bleeding/bruising tendency, phlebitis, or past transfusion. EXAMINATION  Visit Vitals  /66 (BP 1 Location: Left arm, BP Patient Position: Sitting)   Pulse 72   Temp 97.5 °F (36.4 °C) (Temporal)   Resp 18   Ht 5' 10\" (1.778 m)   Wt 200 lb (90.7 kg)   SpO2 99%   BMI 28.70 kg/m²            General appearance: Patient is well-developed and well-nourished in no apparent distress and well groomed. Psych/mental health:  Affect: Appropriate    PHQ  3 most recent PHQ Screens 7/14/2020   PHQ Not Done Active Diagnosis of Depression or Bipolar Disorder   Little interest or pleasure in doing things -   Feeling down, depressed, irritable, or hopeless -   Total Score PHQ 2 -       HEENT: Normocephalic, without evidence of trauma  Full range of motion, no tenderness to palpation in the head or neck region     Cardiovascular: Extremities warm to touch, no swelling appreciated    Respiratory: No dyspnea on exertion noted, normal effort on casual observation    Musculoskeletal: No evidence of significant bony deformities or spinal curvature    Integumentary:  No obvious bruising, lacerations or discoloaration on casual observation. Neurological Examination:   Mental Status:        MMSE  No flowsheet data found. Formal testing was not completed    there was nothing concerning on general observation and discussion.    Alert oriented and appropriate to general conversation  Normal processing on general observation  Followed conversation and responded seemingly appropriate throughout the office visit  No word finding difficulties noted on casual observation  Able to follow directions without difficulty     Cranial Nerves:    PERRLA,   EOMs intact gaze is conjugate  No nystagmus is appreciated  No ARVIN  Facial motor and sensory intact bilaterally  Hearing intact to conversation  Voice with normal projection, no evidence of secretion pooling  Palate elevates symmetrically  Shoulder shrug intact bilaterally  No tongue deviation appreciated     Motor:   Normal tone and bulk  Somewhat poor posture he sits with his back curved and rounded shoulders   fine dexterity intact bilaterally  No tremor appreciated on today's exam  No abnormal movements appreciated on today's exam    Muscle strength testing:  Right side  Upper extremities  Deltoid 5/5  Bicep 5/5  Tricep 5/5  Hand grasp 5/5    Lower extremity  Hip flexor 5/5  Extensor 5/5  Flexor 5/5  Plantar flexion 5/5  Dorsiflexion 5/5      Left side  Deltoid 5/5  Bicep 5/5  Tricep 5/5  Hand grasp 5/5    Lower extremity  Hip flexor 5/5  Extensor 5/5  Flexor 5/5  Plantar flexion 5/5  Dorsiflexion 5/5    Sensation: Intact to light touch bilaterally    Coordination/Cerebellar:   FTN: Intact bilaterally    Gait: Ambulates independently    Fall risk assessment  No flowsheet data found.     Reflexes: Not tested    ASSESSMENT AND PLAN  Chronic refractory headaches and migraines:  Improved on 100 mg of Trokendi    Rescue protocol  For mild to moderate headaches he can use the ibuprofen  For moderate to severe headaches he is to use the rizatriptan at onset and can repeat every 2 hours not to exceed 3 tablets in 24 hours and use it 2 days per 7-day cycle  For the full-blown headaches that he wakes up with I want him to take rizatriptan and an 800 mg tablet to break the headache    Recent fall  Probably has mild neuropathy  Exam pretty much unremarkable  We talked about doing additional imaging and other testing but at this point the patient would prefer to defer and monitor over time  He has agreed to get some basic blood work to see if there is any contributing factors from that perspective; will address abnormalities via MyChart and patient is in agreement with the plan          ICD-10-CM ICD-9-CM    1. Neuropathy  G62.9 355.9 CBC WITH AUTOMATED DIFF      METABOLIC PANEL, COMPREHENSIVE      VITAMIN B12      THIAMINE (VITAMIN B1), WB      VITAMIN B6   2. Cervicogenic headache  R51.9 784.0 CBC WITH AUTOMATED DIFF      METABOLIC PANEL, COMPREHENSIVE      VITAMIN B12      THIAMINE (VITAMIN B1), WB      VITAMIN B6   3. Cervical post-laminectomy syndrome  M96.1 722.81 CBC WITH AUTOMATED DIFF      METABOLIC PANEL, COMPREHENSIVE      VITAMIN B12      THIAMINE (VITAMIN B1), WB      VITAMIN B6           Additional pertinent medical data reviewed at today's office visit:         No visits with results within 3 Month(s) from this visit. Latest known visit with results is:   No results found for any previous visit. XR Results (maximum last 3): No results found for this or any previous visit. CT Results (maximum last 3): No results found for this or any previous visit. MRI Results (maximum last 3): Results from [place] encounter on 12/17/19   MRI BRAIN W WO CONT    Impression IMPRESSION:     1. No acute or significant intracranial abnormality. Results from [place] encounter on 08/20/19   MRI CERV SPINE WO CONT    Impression IMPRESSION:    1. C5-C6 uncovertebral joint hypertrophy may affect the right C6 nerve. 2. 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 3. C3-C4 mild central spinal canal stenosis. 4. Normal cervical spinal cord. 5. Left thyroid lobe hypertrophy. See ultrasound thyroid report from last year. Results from [place] encounter on 09/29/16   MRI LUMB SPINE WO CONT    Impression Impression:  Severe degenerative disc disease at L5-S1. Remote left laminotomy at L5-S1. Small disc bulge with superimposed small central disc protrusion at this level.   Mildly effaced left lateral recess. No neuroforaminal narrowing or spinal canal  stenosis. Follow-up and Dispositions    · Return in about 6 months (around 4/13/2021) for In office appointment.            Kadi Max MS, ANP-BC, [facility]

## 2020-10-21 LAB
ALBUMIN SERPL-MCNC: 4.2 G/DL (ref 4–5)
ALBUMIN/GLOB SERPL: 1.7 {RATIO} (ref 1.2–2.2)
ALP SERPL-CCNC: 65 IU/L (ref 39–117)
ALT SERPL-CCNC: 23 IU/L (ref 0–44)
AST SERPL-CCNC: 18 IU/L (ref 0–40)
BASOPHILS # BLD AUTO: 0 X10E3/UL (ref 0–0.2)
BASOPHILS NFR BLD AUTO: 1 %
BILIRUB SERPL-MCNC: 0.4 MG/DL (ref 0–1.2)
BUN SERPL-MCNC: 9 MG/DL (ref 6–24)
BUN/CREAT SERPL: 8 (ref 9–20)
CALCIUM SERPL-MCNC: 8.8 MG/DL (ref 8.7–10.2)
CHLORIDE SERPL-SCNC: 109 MMOL/L (ref 96–106)
CO2 SERPL-SCNC: 18 MMOL/L (ref 20–29)
CREAT SERPL-MCNC: 1.09 MG/DL (ref 0.76–1.27)
EOSINOPHIL # BLD AUTO: 0.1 X10E3/UL (ref 0–0.4)
EOSINOPHIL NFR BLD AUTO: 2 %
ERYTHROCYTE [DISTWIDTH] IN BLOOD BY AUTOMATED COUNT: 13.1 % (ref 11.6–15.4)
GLOBULIN SER CALC-MCNC: 2.5 G/DL (ref 1.5–4.5)
GLUCOSE SERPL-MCNC: 84 MG/DL (ref 65–99)
HCT VFR BLD AUTO: 42 % (ref 37.5–51)
HGB BLD-MCNC: 14.1 G/DL (ref 13–17.7)
IMM GRANULOCYTES # BLD AUTO: 0 X10E3/UL (ref 0–0.1)
IMM GRANULOCYTES NFR BLD AUTO: 0 %
LYMPHOCYTES # BLD AUTO: 2.1 X10E3/UL (ref 0.7–3.1)
LYMPHOCYTES NFR BLD AUTO: 35 %
MCH RBC QN AUTO: 30.8 PG (ref 26.6–33)
MCHC RBC AUTO-ENTMCNC: 33.6 G/DL (ref 31.5–35.7)
MCV RBC AUTO: 92 FL (ref 79–97)
MONOCYTES # BLD AUTO: 0.5 X10E3/UL (ref 0.1–0.9)
MONOCYTES NFR BLD AUTO: 8 %
NEUTROPHILS # BLD AUTO: 3.2 X10E3/UL (ref 1.4–7)
NEUTROPHILS NFR BLD AUTO: 54 %
PLATELET # BLD AUTO: 227 X10E3/UL (ref 150–450)
POTASSIUM SERPL-SCNC: 4.1 MMOL/L (ref 3.5–5.2)
PROT SERPL-MCNC: 6.7 G/DL (ref 6–8.5)
RBC # BLD AUTO: 4.58 X10E6/UL (ref 4.14–5.8)
SODIUM SERPL-SCNC: 140 MMOL/L (ref 134–144)
VIT B12 SERPL-MCNC: 640 PG/ML (ref 232–1245)
VIT B6 SERPL-MCNC: 15.9 UG/L (ref 5.3–46.7)
WBC # BLD AUTO: 5.9 X10E3/UL (ref 3.4–10.8)

## 2021-04-13 ENCOUNTER — OFFICE VISIT (OUTPATIENT)
Dept: NEUROLOGY | Age: 51
End: 2021-04-13
Payer: COMMERCIAL

## 2021-04-13 VITALS
OXYGEN SATURATION: 100 % | DIASTOLIC BLOOD PRESSURE: 62 MMHG | BODY MASS INDEX: 29.79 KG/M2 | SYSTOLIC BLOOD PRESSURE: 100 MMHG | HEART RATE: 100 BPM | TEMPERATURE: 96.8 F | WEIGHT: 207.6 LBS

## 2021-04-13 DIAGNOSIS — G43.009 MIXED COMMON MIGRAINE AND MUSCLE CONTRACTION HEADACHE: Primary | ICD-10-CM

## 2021-04-13 DIAGNOSIS — H57.02 ANISOCORIA: ICD-10-CM

## 2021-04-13 DIAGNOSIS — M96.1 CERVICAL POST-LAMINECTOMY SYNDROME: ICD-10-CM

## 2021-04-13 DIAGNOSIS — G43.719 INTRACTABLE CHRONIC MIGRAINE WITHOUT AURA AND WITHOUT STATUS MIGRAINOSUS: ICD-10-CM

## 2021-04-13 DIAGNOSIS — R20.0 NUMBNESS AND TINGLING IN LEFT ARM: ICD-10-CM

## 2021-04-13 DIAGNOSIS — G44.209 MIXED COMMON MIGRAINE AND MUSCLE CONTRACTION HEADACHE: Primary | ICD-10-CM

## 2021-04-13 DIAGNOSIS — R20.2 NUMBNESS AND TINGLING IN LEFT ARM: ICD-10-CM

## 2021-04-13 DIAGNOSIS — G44.86 CERVICOGENIC HEADACHE: ICD-10-CM

## 2021-04-13 PROCEDURE — 99215 OFFICE O/P EST HI 40 MIN: CPT | Performed by: PSYCHIATRY & NEUROLOGY

## 2021-04-13 RX ORDER — LORAZEPAM 1 MG/1
TABLET ORAL
COMMUNITY
Start: 2021-02-03 | End: 2021-08-11

## 2021-04-13 RX ORDER — DICLOFENAC EPOLAMINE 0.01 G/1
PATCH TOPICAL
COMMUNITY
Start: 2021-03-09 | End: 2021-08-11

## 2021-04-13 RX ORDER — DEXAMETHASONE 4 MG/1
TABLET ORAL
COMMUNITY
Start: 2021-01-18 | End: 2021-08-11

## 2021-04-13 RX ORDER — SODIUM PICOSULFATE, MAGNESIUM OXIDE, AND ANHYDROUS CITRIC ACID 10; 3.5; 12 MG/160ML; G/160ML; G/160ML
LIQUID ORAL
COMMUNITY
Start: 2021-03-16 | End: 2021-08-11

## 2021-04-13 RX ORDER — GALCANEZUMAB 120 MG/ML
120 INJECTION, SOLUTION SUBCUTANEOUS
Qty: 1 ML | Refills: 11 | Status: SHIPPED | OUTPATIENT
Start: 2021-04-13 | End: 2022-04-21

## 2021-04-13 RX ORDER — GALCANEZUMAB 120 MG/ML
240 INJECTION, SOLUTION SUBCUTANEOUS ONCE
Qty: 2 ML | Refills: 0 | Status: SHIPPED | COMMUNITY
Start: 2021-04-13 | End: 2021-04-13

## 2021-04-13 RX ORDER — PREGABALIN 50 MG/1
CAPSULE ORAL
COMMUNITY
Start: 2021-01-18 | End: 2021-04-13 | Stop reason: SINTOL

## 2021-04-13 NOTE — PROGRESS NOTES
[facility] 83  In Office FOLLOW-UP VISIT         Scott Castellanos is a 46 y.o. male who presents today for the following:  Chief Complaint   Patient presents with    Migraine     following up on migraine medication not working well         ASSESSMENT AND PLAN    Worsening headaches and migraines  Reduce Topamax back down to 100 mg/day  Emgality loading dose given at today's office visit patient tolerated well please refer to nurse's notes for specifics  Prescription for Emgality plus co-pay card was given to the patient  Might need to repeat MRI scan if headaches do not improve   I personally reviewed the MRI scan films from 2019. He does have 1 lesion noted on T2 scan but does not appear consistent with lesion activity for migraine then again I am not a radiologist.  In the light of developing anisocoria and persistent numbness down his left arm and a longstanding history of balance difficulties 1 has to consider the possibility of demyelinating disorder   If he is not significantly improved when I see him back MRI of the brain to look for demyelinating disease will be ordered  I also looked at the films of the MRI scan of the cervical spine from 2019 as well    Left arm numbness  Being addressed by orthopedics  We will defer at this time      ICD-10-CM ICD-9-CM    1. 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 Cervicogenic headache  R51.9 784.0    3. Cervical post-laminectomy syndrome  M96.1 722.81    4. Intractable chronic migraine without aura and without status migrainosus  G43.719 346.71 galcanezumab-gnlm (Emgality Pen) 120 mg/mL injection      galcanezumab-gnlm (Emgality Pen) 120 mg/mL injection   5. Anisocoria  H57.02 379.41    6.  Numbness and tingling in left arm  R20.0 782.0     R20.2           I attest that 60 minutes was spent on today's visit reviewing medical records and diagnostic testing deemed pertinent to this patient's care, along with direct time spent at patient's visit including the history, physical assessment and plan, discussing diagnosis and management along with documentation.       HPI  Historical Data  Patient is previously been seen by Dr. Brianda Hall     Neurologic diagnosis:  Headaches and migraines             Location: Bitemporal, frontal in the back of his head             Quality: Pressure/throbbing             Associated symptoms: Nausea occasional vomiting     Preventative therapies tried  Propranolol  Topamax  Trokendi  Cymbalta     Rescue medications:  Maxalt  Ibuprofen 800 mg as needed     Duplex studies completed January 9, 2020: 0% stenosis bilateral carotid artery and normal vertebral anterograde flow     MRI of the brain from December 19, 2020 shows minimal [less than 5] scattered nonspecific T2 flair white matter hyper intensities felt to be of no clinical significance otherwise unremarkable     Other significant comorbid conditions  Cervical fusion x3         Interim Data:      Headaches and migraines  13 migraines this past month    Was down to 1-9/month previously    Patient spoke with Dr. Brianda Hall who asked him to increase the Trokendi to 200 mg daily  He has noticed no change in his headaches and migraine frequency  April he is already had 8 migraines to date    He continues with rescue protocol of using Maxalt and ibuprofen which works more effectively than either independently he usually only needs 1 dose but on occasion he does have to redose  He reports that his headaches are completely resolved with rescue medication    He is not incapacitated from his headaches and is not missing a time at work at this point time    Since June pain and numbness LT arm   PT   Injections   Pt states has had MRI and EMG w ortho    He feels his headaches are all related to his cervical spine issues  As they started when his issues related to his spine started     History of falls  He had one fall since last office visit but he fell back on the couch but that does tend to jar his neck which he thinks worsens his headaches  He cannot really articulate any reason for why he has these falls  He denies symptoms related to feeling lightheaded, dizzy, presyncopal he denies blacking out  He denies any chest pain shortness of breath diaphoresis acute visual changes hearing loss tinnitus speech swallowing difficulties focal paresthesias or weakness     He does claim that he has chronic issues related to balance  He often stumbles    Pertinent diagnostic data    No visits with results within 6 Month(s) from this visit. Latest known visit with results is:   Office Visit on 10/13/2020   Component Date Value Ref Range Status    WBC 10/14/2020 5.9  3.4 - 10.8 x10E3/uL Final    RBC 10/14/2020 4.58  4.14 - 5.80 x10E6/uL Final    HGB 10/14/2020 14.1  13.0 - 17.7 g/dL Final    HCT 10/14/2020 42.0  37.5 - 51.0 % Final    MCV 10/14/2020 92  79 - 97 fL Final    MCH 10/14/2020 30.8  26.6 - 33.0 pg Final    MCHC 10/14/2020 33.6  31.5 - 35.7 g/dL Final    RDW 10/14/2020 13.1  11.6 - 15.4 % Final    PLATELET 46/60/5903 968  150 - 450 x10E3/uL Final    NEUTROPHILS 10/14/2020 54  Not Estab. % Final    Lymphocytes 10/14/2020 35  Not Estab. % Final    MONOCYTES 10/14/2020 8  Not Estab. % Final    EOSINOPHILS 10/14/2020 2  Not Estab. % Final    BASOPHILS 10/14/2020 1  Not Estab. % Final    ABS. NEUTROPHILS 10/14/2020 3.2  1.4 - 7.0 x10E3/uL Final    Abs Lymphocytes 10/14/2020 2.1  0.7 - 3.1 x10E3/uL Final    ABS. MONOCYTES 10/14/2020 0.5  0.1 - 0.9 x10E3/uL Final    ABS. EOSINOPHILS 10/14/2020 0.1  0.0 - 0.4 x10E3/uL Final    ABS. BASOPHILS 10/14/2020 0.0  0.0 - 0.2 x10E3/uL Final    IMMATURE GRANULOCYTES 10/14/2020 0  Not Estab. % Final    ABS. IMM. GRANS.  10/14/2020 0.0  0.0 - 0.1 x10E3/uL Final    Glucose 10/14/2020 84  65 - 99 mg/dL Final    BUN 10/14/2020 9  6 - 24 mg/dL Final    Creatinine 10/14/2020 1.09  0.76 - 1.27 mg/dL Final    GFR est non-AA 10/14/2020 79  >59 mL/min/1.73 Final    GFR est AA 10/14/2020 91  >59 mL/min/1.73 Final    BUN/Creatinine ratio 10/14/2020 8* 9 - 20 Final    Sodium 10/14/2020 140  134 - 144 mmol/L Final    Potassium 10/14/2020 4.1  3.5 - 5.2 mmol/L Final    Chloride 10/14/2020 109* 96 - 106 mmol/L Final    CO2 10/14/2020 18* 20 - 29 mmol/L Final    Calcium 10/14/2020 8.8  8.7 - 10.2 mg/dL Final    Protein, total 10/14/2020 6.7  6.0 - 8.5 g/dL Final    Albumin 10/14/2020 4.2  4.0 - 5.0 g/dL Final    GLOBULIN, TOTAL 10/14/2020 2.5  1.5 - 4.5 g/dL Final    A-G Ratio 10/14/2020 1.7  1.2 - 2.2 Final    Bilirubin, total 10/14/2020 0.4  0.0 - 1.2 mg/dL Final    Alk. phosphatase 10/14/2020 65  39 - 117 IU/L Final    AST (SGOT) 10/14/2020 18  0 - 40 IU/L Final    ALT (SGPT) 10/14/2020 23  0 - 44 IU/L Final    Vitamin B12 10/14/2020 640  232 - 1,245 pg/mL Final    Vitamin B6 10/14/2020 15.9  5.3 - 46.7 ug/L Final       MRI of the cervical spine completed January 27 2021 [noted in care everywhere tab]    IMPRESSION:    1. C5-C6 right uncovertebral joint hypertrophy may affect the right C6 nerve. 2. C3-C4 mild central spinal canal stenosis. 3. Anterior fusion C4-C7. 4. Normal cervical spinal cord. Results from [place] encounter on 12/17/19   MRI BRAIN W WO CONT    Impression IMPRESSION:     1. No acute or significant intracranial abnormality. Results from [place] encounter on 08/20/19   MRI CERV SPINE WO CONT    Impression IMPRESSION:    1. C5-C6 uncovertebral joint hypertrophy may affect the right C6 nerve. 2. 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 3. C3-C4 mild central spinal canal stenosis. 4. Normal cervical spinal cord. 5. Left thyroid lobe hypertrophy. See ultrasound thyroid report from last year.    Results from [place] encounter on 09/29/16   MRI LUMB SPINE WO CONT    Impression Impression:  Severe degenerative disc disease at L5-S1. Remote left laminotomy at L5-S1. Small disc bulge with superimposed small central disc protrusion at this level. Mildly effaced left lateral recess. No neuroforaminal narrowing or spinal canal  stenosis. Allergies   Allergen Reactions    Contrast Dye [Iodine] Hives    Lyrica [Pregabalin] Other (comments)     Behavior change       Current Outpatient Medications   Medication Sig    diclofenac (FLECTOR) 1.3 % pt12 APPLY 1 PATCH TOPICALLY ONCE DAILY    galcanezumab-gnlm (Emgality Pen) 120 mg/mL injection 2 mL by SubCUTAneous route once for 1 dose. Indications: migraine prevention    galcanezumab-gnlm (Emgality Pen) 120 mg/mL injection 1 mL by SubCUTAneous route every thirty (30) days. Indications: migraine prevention    ibuprofen (MOTRIN) 800 mg tablet Take 1 Tab by mouth every six (6) hours as needed for Pain.  topiramate ER (Trokendi XR) 100 mg capsule Take 1 Cap by mouth daily. Indications: migraine prevention    propranolol LA (INDERAL LA) 60 mg SR capsule TAKE 1 CAPSULE BY MOUTH EVERY DAY    DULoxetine (CYMBALTA) 30 mg capsule 30 mg daily.  simvastatin (ZOCOR) 10 mg tablet 10 mg nightly.  cholecalciferol, vitamin D3, (VITAMIN D3) 2,000 unit tab Take  by mouth daily.  rizatriptan (MAXALT) 10 mg tablet Take 1 Tab by mouth every eight (8) hours as needed for Migraine. May repeat in 2 hours if needed    dexAMETHasone (DECADRON) 4 mg tablet TAKE 1 TABLET (4 MG TOTAL) BY MOUTH DAILY WITH BREAKFAST FOR 7 DAYS    Clenpiq 10 mg-3.5 gram -12 gram/160 mL soln USE AS DIRECTED    LORazepam (ATIVAN) 1 mg tablet TAKE 1 TAB 45MIN PRIOR TO INJECTION, REPEAT IF NEEDED    multivit with min-folic acid (ADULT MULTIVITAMIN GUMMIES) 200 mcg chew Take  by mouth daily.  omega 3-dha-epa-fish oil 250-350-1,000 mg cap Take 2 Caps by mouth daily.  magnesium oxide (MAG-OX) 400 mg tablet Take 400 mg by mouth daily.  aspirin delayed-release 81 mg tablet Take  by mouth daily.      No current facility-administered medications for this visit. Past medical history/surgical history, family history, and social history have been reviewed for today's visit      ROS    A ten system review of constitutional, cardiovascular, respiratory, musculoskeletal, endocrine, skin, SHEENT, genitourinary, psychiatric and neurologic systems was obtained and is unremarkable except as mentioned under HPI          EXAMINATION:     Visit Vitals  /62   Pulse 100   Temp 96.8 °F (36 °C)   Wt 207 lb 9.6 oz (94.2 kg)   SpO2 100%   BMI 29.79 kg/m²         General appearance: Patient is well-developed and well-nourished in no apparent distress and well groomed. Psych/mental health:  Affect: Appropriate    PHQ  3 most recent PHQ Screens 4/13/2021   PHQ Not Done Active Diagnosis of Depression or Bipolar Disorder   Little interest or pleasure in doing things Not at all   Feeling down, depressed, irritable, or hopeless Not at all   Total Score PHQ 2 0       HEENT:   Normocephalic  With evidence of trauma: No  Full range of motion head neck: Yes  Tenderness to palpation of the head neck region: No      Cardiovascular:     Extremities warm to touch: Yes  Extremity swelling: No  Discoloration: No  Evidence of PVD: No    Respiratory:   Dyspnea on exertion: No   Abnormal effort on casual observation: No   Use of portable oxygen: No   Evidence of cyanosis: No     Musculoskeletal:   Evidence of significant bone deformities: No   Spinal curvature: No     Integumentary:    Obvious bruising: No   Lacerations or discoloration on casual observation: No       Neurological Examination:   Mental Status:        MMSE  No flowsheet data found. Formal testing was not completed    there was nothing concerning on general observation and discussion.    Alert oriented and appropriate to general conversation  Normal processing on general observation  Followed conversation and responded seemingly appropriate throughout the office visit  No word finding difficulties noted on casual observation  Able to follow directions without difficulty     Cranial Nerves:    Left pupil greater than right on general observation of both constrict down to within 1 mm difference  EOMs intact gaze is conjugate  No nystagmus is appreciated  Facial motor intact bilaterally  Hearing intact to conversation  Voice with normal projection, no evidence of secretion pooling  Shoulder shrug intact bilaterally  No tongue deviation appreciated     Motor:   Normal bulk  No tremor appreciated on today's exam  No abnormal movements appreciated on today's exam  Moves extremities spontaneously and with purpose  5/5 x 4      Sensation: Intact to light touch    Coordination/Cerebellar:   Grossly intact    Gait: Ambulates independently    Reflexes: Symmetrical    Fall risk assessment  No flowsheet data found. Follow-up and Dispositions    · Return in about 4 weeks (around 5/11/2021) for In office appointment.            Sebastian Schroeder MS, ANP-BC, [facility]

## 2021-04-13 NOTE — PROGRESS NOTES
Emgality injection given subcutaneous right upper arm. Patient tolerated injection. Instructed patient on how to give self injection. Patient administered injection to self in right side of abdomen. Patient tolerated injection.

## 2021-04-13 NOTE — PATIENT INSTRUCTIONS
As per our discussion  Reduce the Trokendi back to just 100 mg/day  Preventive start Emgality loading dose was given today and then you will give yourself 1 injection about every 30 days  I am can see you back in about 30 days so if you do not feel comfortable giving next month injection you can bring your own supply with you to the office and we can review the injection technique again at that time but if you feel comfortable you can inject at home    Continue with Maxalt and ibuprofen for rescue medication    I do want you to keep a headache log and bring it with you at next visit    I strongly encourage you to use my chart if you need to contact us. Presently with a high utilization of telehealth it is very difficult to get through on her phone lines. If you have not already activated my chart or you forget your password their instructions in this packet to get my chart activated. Office Policies    o Phone calls/patient messages:  Please allow up to 24 hours for someone in the office to contact you about your call or message. Be mindful your provider may be out of the office or your message may require further review. We encourage you to use [employer] for your messages as this is a faster, more efficient way to communicate with our office    o Medication Refills:  Prescription medications require up to 48 business hours to process. We encourage you to use [employer] for your refills. For controlled medications: Please allow up to 72 business hours to process. Certain medications may require you to  a written prescription at our office. NO narcotic/controlled medications will be prescribed after 4pm Monday through Friday or on weekends    o Form/Paperwork Completion:  We ask that you allow 7-14 business days. You may also download your forms to [employer] to have your doctor print off.

## 2021-05-13 ENCOUNTER — OFFICE VISIT (OUTPATIENT)
Dept: NEUROLOGY | Age: 51
End: 2021-05-13
Payer: COMMERCIAL

## 2021-05-13 VITALS
HEART RATE: 72 BPM | WEIGHT: 204.2 LBS | DIASTOLIC BLOOD PRESSURE: 74 MMHG | SYSTOLIC BLOOD PRESSURE: 124 MMHG | BODY MASS INDEX: 29.3 KG/M2 | OXYGEN SATURATION: 99 %

## 2021-05-13 DIAGNOSIS — G44.209 MIXED COMMON MIGRAINE AND MUSCLE CONTRACTION HEADACHE: Primary | ICD-10-CM

## 2021-05-13 DIAGNOSIS — M96.1 CERVICAL POST-LAMINECTOMY SYNDROME: ICD-10-CM

## 2021-05-13 DIAGNOSIS — G43.009 MIXED COMMON MIGRAINE AND MUSCLE CONTRACTION HEADACHE: Primary | ICD-10-CM

## 2021-05-13 DIAGNOSIS — R20.2 NUMBNESS AND TINGLING IN LEFT ARM: ICD-10-CM

## 2021-05-13 DIAGNOSIS — Z91.81 PERSONAL HISTORY OF FALL: ICD-10-CM

## 2021-05-13 DIAGNOSIS — R26.89 BALANCE PROBLEM: ICD-10-CM

## 2021-05-13 DIAGNOSIS — R20.0 NUMBNESS AND TINGLING IN LEFT ARM: ICD-10-CM

## 2021-05-13 DIAGNOSIS — G44.86 CERVICOGENIC HEADACHE: ICD-10-CM

## 2021-05-13 PROCEDURE — 99214 OFFICE O/P EST MOD 30 MIN: CPT | Performed by: PSYCHIATRY & NEUROLOGY

## 2021-05-13 NOTE — PATIENT INSTRUCTIONS
As per discussion  Continue on Emgality monthly and for now continue on Trokendi once we get to steady state with Emgality your headaches are stable we will slowly wean the Trokendi    Rescue medication  Please take at onset of headache and redose as soon as allowed if headache is not 100% resolved    I would recommend wearing your glasses especially since you have problems with falls and balance it is probably due to depth perception issues since neuroimaging has not been supportive for any other issues    Thank you for getting your Covid vaccine :-)    If you need to get a hold of me before you see me back my chart is most efficient method of doing so    Office Policies    o Phone calls/patient messages:  Please allow up to 24 hours for someone in the office to contact you about your call or message. Be mindful your provider may be out of the office or your message may require further review. We encourage you to use [employer] for your messages as this is a faster, more efficient way to communicate with our office    o Medication Refills:  Prescription medications require up to 48 business hours to process. We encourage you to use [employer] for your refills. For controlled medications: Please allow up to 72 business hours to process. Certain medications may require you to  a written prescription at our office. NO narcotic/controlled medications will be prescribed after 4pm Monday through Friday or on weekends    o Form/Paperwork Completion:  We ask that you allow 7-14 business days. You may also download your forms to [employer] to have your doctor print off.

## 2021-05-13 NOTE — PROGRESS NOTES
H[facility] 83  In Office FOLLOW-UP VISIT         Edwardo Beach is a 46 y.o. male who presents today for the following:  Chief Complaint   Patient presents with    Migraine     BETTER ONLY HAD 2 SINCE THE INJECTIONS         ASSESSMENT AND PLAN  Headaches migraines  Significantly improved on Emgality. He has a reduction in frequency intensity and duration of headaches  Additionally there is a reduction in rescue medication  Is to continue Emgality monthly and for now continue on Trokendi although we can look to reduce this once Emgality has reached steady state in approximately 6 months  Quality of life is significantly improved    Left arm numbness  Without complaint today and has been followed by orthopedics    Imbalance issues  And possibility related to cognitive fogginess with frequent migraines additionally patient is supposed to be wearing glasses and does not so his depth perception may be off  It is baseline  Past neuroimaging have been unimpressive  We will just keep an eye to this over time      ICD-10-CM ICD-9-CM    1. 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 Cervicogenic headache  R51.9 784.0    3. Cervical post-laminectomy syndrome  M96.1 722.81    4. Numbness and tingling in left arm  R20.0 782.0     R20.2     5. Balance problem  R26.89 781.99    6. Personal history of fall  Z91.81 V15.88          I attest that 30 minutes was spent on today's visit reviewing medical records and diagnostic testing deemed pertinent to this patient's care, along with direct time spent at patient's visit including the history, physical assessment and plan, discussing diagnosis and management along with documentation.       HPI  Historical Data  Patient is previously been seen by Dr. Miguel Christopher     Neurologic diagnosis:  Headaches and migraines             Location: Bitemporal, frontal in the back of his head             Quality: Pressure/throbbing             Associated symptoms: Nausea occasional vomiting     Preventative therapies tried  Propranolol  Topamax  Trokendi  Cymbalta     Rescue medications:  Maxalt  Ibuprofen 800 mg as needed     Duplex studies completed January 9, 2020: 0% stenosis bilateral carotid artery and normal vertebral anterograde flow     MRI of the brain from December 19, 2020 shows minimal [less than 5] scattered nonspecific T2 flair white matter hyper intensities felt to be of no clinical significance otherwise unremarkable     Other significant comorbid conditions  Cervical fusion x3         Interim Data:      Headaches and migraines  Previous to Emgality headaches were approximately 13/month  Currently headaches are approximately 1-2 times per month   Often needing 1 dose of abortive medication unless he gets to it late then he will have to redose  He also is no longer missing time at work    Patient continues on Trokendi 100 mg/day    Rescue medication  Maxalt and ibuprofen      Since June pain and numbness LT arm   PT   Injections   Pt states has had MRI and EMG w ortho   Followed by Ortho       History of falls/balance and stumbling issues  He had one fall since last office visit but he fell back on the couch but that does tend to jar his neck which he thinks worsens his headaches  He cannot really articulate any reason for why he has these falls  He denies symptoms related to feeling lightheaded, dizzy, presyncopal he denies blacking out  He denies any chest pain shortness of breath diaphoresis acute visual changes hearing loss tinnitus speech swallowing difficulties focal paresthesias or weakness  Patient attests needing to wear glasses but does not       Pertinent diagnostic data    No visits with results within 6 Month(s) from this visit.    Latest known visit with results is:   Office Visit on 10/13/2020   Component Date Value Ref Range Status    WBC 10/14/2020 5.9  3.4 - 10.8 x10E3/uL Final    RBC 10/14/2020 4.58  4.14 - 5.80 x10E6/uL Final    HGB 10/14/2020 14.1  13.0 - 17.7 g/dL Final    HCT 10/14/2020 42.0  37.5 - 51.0 % Final    MCV 10/14/2020 92  79 - 97 fL Final    MCH 10/14/2020 30.8  26.6 - 33.0 pg Final    MCHC 10/14/2020 33.6  31.5 - 35.7 g/dL Final    RDW 10/14/2020 13.1  11.6 - 15.4 % Final    PLATELET 73/82/6081 206  150 - 450 x10E3/uL Final    NEUTROPHILS 10/14/2020 54  Not Estab. % Final    Lymphocytes 10/14/2020 35  Not Estab. % Final    MONOCYTES 10/14/2020 8  Not Estab. % Final    EOSINOPHILS 10/14/2020 2  Not Estab. % Final    BASOPHILS 10/14/2020 1  Not Estab. % Final    ABS. NEUTROPHILS 10/14/2020 3.2  1.4 - 7.0 x10E3/uL Final    Abs Lymphocytes 10/14/2020 2.1  0.7 - 3.1 x10E3/uL Final    ABS. MONOCYTES 10/14/2020 0.5  0.1 - 0.9 x10E3/uL Final    ABS. EOSINOPHILS 10/14/2020 0.1  0.0 - 0.4 x10E3/uL Final    ABS. BASOPHILS 10/14/2020 0.0  0.0 - 0.2 x10E3/uL Final    IMMATURE GRANULOCYTES 10/14/2020 0  Not Estab. % Final    ABS. IMM. GRANS. 10/14/2020 0.0  0.0 - 0.1 x10E3/uL Final    Glucose 10/14/2020 84  65 - 99 mg/dL Final    BUN 10/14/2020 9  6 - 24 mg/dL Final    Creatinine 10/14/2020 1.09  0.76 - 1.27 mg/dL Final    GFR est non-AA 10/14/2020 79  >59 mL/min/1.73 Final    GFR est AA 10/14/2020 91  >59 mL/min/1.73 Final    BUN/Creatinine ratio 10/14/2020 8* 9 - 20 Final    Sodium 10/14/2020 140  134 - 144 mmol/L Final    Potassium 10/14/2020 4.1  3.5 - 5.2 mmol/L Final    Chloride 10/14/2020 109* 96 - 106 mmol/L Final    CO2 10/14/2020 18* 20 - 29 mmol/L Final    Calcium 10/14/2020 8.8  8.7 - 10.2 mg/dL Final    Protein, total 10/14/2020 6.7  6.0 - 8.5 g/dL Final    Albumin 10/14/2020 4.2  4.0 - 5.0 g/dL Final    GLOBULIN, TOTAL 10/14/2020 2.5  1.5 - 4.5 g/dL Final    A-G Ratio 10/14/2020 1.7  1.2 - 2.2 Final    Bilirubin, total 10/14/2020 0.4  0.0 - 1.2 mg/dL Final    Alk.  phosphatase 10/14/2020 65  39 - 117 IU/L Final    AST (SGOT) 10/14/2020 18  0 - 40 IU/L Final    ALT (SGPT) 10/14/2020 23  0 - 44 IU/L Final    Vitamin B12 10/14/2020 640  232 - 1,245 pg/mL Final    Vitamin B6 10/14/2020 15.9  5.3 - 46.7 ug/L Final       MRI of the cervical spine completed January 27 2021 [noted in care everywhere tab]    IMPRESSION:    1. C5-C6 right uncovertebral joint hypertrophy may affect the right C6 nerve. 2. C3-C4 mild central spinal canal stenosis. 3. Anterior fusion C4-C7. 4. Normal cervical spinal cord. Results from [place] encounter on 12/17/19   MRI BRAIN W WO CONT    Impression IMPRESSION:     1. No acute or significant intracranial abnormality. Results from [place] encounter on 08/20/19   MRI CERV SPINE WO CONT    Impression IMPRESSION:    1. C5-C6 uncovertebral joint hypertrophy may affect the right C6 nerve. 2. 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 3. C3-C4 mild central spinal canal stenosis. 4. Normal cervical spinal cord. 5. Left thyroid lobe hypertrophy. See ultrasound thyroid report from last year. Results from [place] encounter on 09/29/16   MRI LUMB SPINE WO CONT    Impression Impression:  Severe degenerative disc disease at L5-S1. Remote left laminotomy at L5-S1. Small disc bulge with superimposed small central disc protrusion at this level. Mildly effaced left lateral recess. No neuroforaminal narrowing or spinal canal  stenosis. Allergies   Allergen Reactions    Contrast Dye [Iodine] Hives    Lyrica [Pregabalin] Other (comments)     Behavior change       Current Outpatient Medications   Medication Sig    diclofenac (FLECTOR) 1.3 % pt12 APPLY 1 PATCH TOPICALLY ONCE DAILY    galcanezumab-gnlm (Emgality Pen) 120 mg/mL injection 1 mL by SubCUTAneous route every thirty (30) days. Indications: migraine prevention    ibuprofen (MOTRIN) 800 mg tablet Take 1 Tab by mouth every six (6) hours as needed for Pain.     topiramate ER (Trokendi XR) 100 mg capsule Take 1 Cap by mouth daily. Indications: migraine prevention    propranolol LA (INDERAL LA) 60 mg SR capsule TAKE 1 CAPSULE BY MOUTH EVERY DAY    DULoxetine (CYMBALTA) 30 mg capsule 30 mg daily.  simvastatin (ZOCOR) 10 mg tablet 10 mg nightly.  cholecalciferol, vitamin D3, (VITAMIN D3) 2,000 unit tab Take  by mouth daily.  rizatriptan (MAXALT) 10 mg tablet Take 1 Tab by mouth every eight (8) hours as needed for Migraine. May repeat in 2 hours if needed    aspirin delayed-release 81 mg tablet Take  by mouth daily.  dexAMETHasone (DECADRON) 4 mg tablet TAKE 1 TABLET (4 MG TOTAL) BY MOUTH DAILY WITH BREAKFAST FOR 7 DAYS    Clenpiq 10 mg-3.5 gram -12 gram/160 mL soln USE AS DIRECTED    LORazepam (ATIVAN) 1 mg tablet TAKE 1 TAB 45MIN PRIOR TO INJECTION, REPEAT IF NEEDED    multivit with min-folic acid (ADULT MULTIVITAMIN GUMMIES) 200 mcg chew Take  by mouth daily.  omega 3-dha-epa-fish oil 250-350-1,000 mg cap Take 2 Caps by mouth daily.  magnesium oxide (MAG-OX) 400 mg tablet Take 400 mg by mouth daily. No current facility-administered medications for this visit. Past medical history/surgical history, family history, and social history have been reviewed for today's visit      ROS    A ten system review of constitutional, cardiovascular, respiratory, musculoskeletal, endocrine, skin, SHEENT, genitourinary, psychiatric and neurologic systems was obtained and is unremarkable except as mentioned under HPI          EXAMINATION:     Visit Vitals  /74   Pulse 72   Wt 204 lb 3.2 oz (92.6 kg)   SpO2 99%   BMI 29.30 kg/m²         General appearance: Patient is well-developed and well-nourished in no apparent distress and well groomed.     Psych/mental health:  Affect: Appropriate    PHQ  3 most recent PHQ Screens 5/13/2021   PHQ Not Done -   Little interest or pleasure in doing things Not at all   Feeling down, depressed, irritable, or hopeless Not at all   Total Score PHQ 2 0       HEENT: Normocephalic  With evidence of trauma: No  Full range of motion head neck: Yes  Tenderness to palpation of the head neck region: No      Cardiovascular:     Extremities warm to touch: Yes  Extremity swelling: No  Discoloration: No  Evidence of PVD: No    Respiratory:   Dyspnea on exertion: No   Abnormal effort on casual observation: No   Use of portable oxygen: No   Evidence of cyanosis: No     Musculoskeletal:   Evidence of significant bone deformities: No   Spinal curvature: No     Integumentary:    Obvious bruising: No   Lacerations or discoloration on casual observation: No       Neurological Examination:   Mental Status:        MMSE  No flowsheet data found. Formal testing was not completed    there was nothing concerning on general observation and discussion. Alert oriented and appropriate to general conversation  Normal processing on general observation  Followed conversation and responded seemingly appropriate throughout the office visit  No word finding difficulties noted on casual observation  Able to follow directions without difficulty     Cranial Nerves:    PERRLA  EOMs intact gaze is conjugate  No nystagmus is appreciated  Facial motor intact bilaterally  Hearing intact to conversation  Voice with normal projection, no evidence of secretion pooling  Shoulder shrug intact bilaterally  No tongue deviation appreciated     Motor:   Normal bulk  No tremor appreciated on today's exam  No abnormal movements appreciated on today's exam  Moves extremities spontaneously and with purpose  5/5 x 4      Sensation: Intact to light touch    Coordination/Cerebellar:   Grossly intact    Gait: Ambulates independently    Reflexes: Symmetrical    Fall risk assessment  No flowsheet data found. Follow-up and Dispositions    · Return in about 3 months (around 8/13/2021) for In office appointment.            Ila Natarajan MS, ANP-BC, [facility]

## 2021-08-11 ENCOUNTER — OFFICE VISIT (OUTPATIENT)
Dept: NEUROLOGY | Age: 51
End: 2021-08-11
Payer: COMMERCIAL

## 2021-08-11 VITALS
OXYGEN SATURATION: 99 % | HEART RATE: 67 BPM | WEIGHT: 213 LBS | RESPIRATION RATE: 16 BRPM | DIASTOLIC BLOOD PRESSURE: 80 MMHG | BODY MASS INDEX: 30.49 KG/M2 | HEIGHT: 70 IN | SYSTOLIC BLOOD PRESSURE: 112 MMHG

## 2021-08-11 DIAGNOSIS — G44.209 MIXED COMMON MIGRAINE AND MUSCLE CONTRACTION HEADACHE: ICD-10-CM

## 2021-08-11 DIAGNOSIS — G43.009 MIXED COMMON MIGRAINE AND MUSCLE CONTRACTION HEADACHE: ICD-10-CM

## 2021-08-11 DIAGNOSIS — G44.86 CERVICOGENIC HEADACHE: ICD-10-CM

## 2021-08-11 PROCEDURE — 99214 OFFICE O/P EST MOD 30 MIN: CPT | Performed by: PSYCHIATRY & NEUROLOGY

## 2021-08-11 NOTE — PROGRESS NOTES
Hammad 83  In Office FOLLOW-UP VISIT         [place] Costello is a 46 y.o. male who presents today for the following:  Chief Complaint   Patient presents with    Follow-up    Migraine     last three months he has had 5, 3, and 5, can't distinguish the different headache vs migraine          ASSESSMENT AND PLAN  Headaches migraines  Significantly improved on Emgality. He has a reduction in frequency intensity and duration of headaches  Additionally there is a reduction in rescue medication  Is to continue Emgality monthly and for now continue on Trokendi although we can look to reduce this once Emgality has reached steady state in approximately 6 months  Quality of life is significantly improved    Left arm numbness  Without complaint today and has been followed by orthopedics    Imbalance issues  And possibility related to cognitive fogginess with frequent migraines additionally patient is supposed to be wearing glasses and does not so his depth perception may be off  It is baseline  Past neuroimaging have been unimpressive  We will just keep an eye to this over time      ICD-10-CM ICD-9-CM    1. Mixed common migraine and muscle contraction headache  G43.009 346.10 topiramate ER (Trokendi XR) 100 mg capsule    G44.209 307.81    2.  Cervicogenic headache  R51.9 784.0 topiramate ER (Trokendi XR) 100 mg capsule         HPI  Historical Data  Patient is previously been seen by Dr. Gala Bowman     Neurologic diagnosis:  Headaches and migraines             Location: Bitemporal, frontal in the back of his head             Quality: Pressure/throbbing             Associated symptoms: Nausea occasional vomiting     Preventative therapies tried  Propranolol  Topamax  Trokendi  Cymbalta     Rescue medications:  Maxalt  Ibuprofen 800 mg as needed     Duplex studies completed January 9, 2020: 0% stenosis bilateral carotid artery and normal vertebral anterograde flow     MRI of the brain from December 19, 2020 shows minimal [less than 5] scattered nonspecific T2 flair white matter hyper intensities felt to be of no clinical significance otherwise unremarkable     Other significant comorbid conditions  Cervical fusion x3   Arthroscopic left shoulder surgery June 2021        Interim Data:      Headaches and migraines  Previous to Emgality headaches were approximately 13/month  Currently headaches are approximately 1-2 times per month   Often needing 1 dose of abortive medication unless he gets to it late then he will have to redose  He also is no longer missing time at work    Patient continues on Trokendi 100 mg/day    Rescue medication  Maxalt and ibuprofen      Since June pain and numbness LT arm   PT   Injections   Pt states has had MRI and EMG w ortho   Followed by Ortho   S/p arthroscopic surgery Lt arm Marilee 10 2021    Currently in PT and symptoms are improving       History of falls/balance and stumbling issues  He had one fall since last office visit but he fell back on the couch but that does tend to jar his neck which he thinks worsens his headaches  He cannot really articulate any reason for why he has these falls  He denies symptoms related to feeling lightheaded, dizzy, presyncopal he denies blacking out  He denies any chest pain shortness of breath diaphoresis acute visual changes hearing loss tinnitus speech swallowing difficulties focal paresthesias or weakness  Patient attests needing to wear glasses but does not  OV: 8/11/21: off cymbalta and sx improved            Pertinent diagnostic data    No visits with results within 6 Month(s) from this visit.    Latest known visit with results is:   Office Visit on 10/13/2020   Component Date Value Ref Range Status    WBC 10/14/2020 5.9  3.4 - 10.8 x10E3/uL Final    RBC 10/14/2020 4.58  4.14 - 5.80 x10E6/uL Final    HGB 10/14/2020 14.1  13.0 - 17.7 g/dL Final    HCT 10/14/2020 42.0  37.5 - 51.0 % Final    MCV 10/14/2020 92  79 - 97 fL Final  MCH 10/14/2020 30.8  26.6 - 33.0 pg Final    MCHC 10/14/2020 33.6  31.5 - 35.7 g/dL Final    RDW 10/14/2020 13.1  11.6 - 15.4 % Final    PLATELET 81/64/8640 981  150 - 450 x10E3/uL Final    NEUTROPHILS 10/14/2020 54  Not Estab. % Final    Lymphocytes 10/14/2020 35  Not Estab. % Final    MONOCYTES 10/14/2020 8  Not Estab. % Final    EOSINOPHILS 10/14/2020 2  Not Estab. % Final    BASOPHILS 10/14/2020 1  Not Estab. % Final    ABS. NEUTROPHILS 10/14/2020 3.2  1.4 - 7.0 x10E3/uL Final    Abs Lymphocytes 10/14/2020 2.1  0.7 - 3.1 x10E3/uL Final    ABS. MONOCYTES 10/14/2020 0.5  0.1 - 0.9 x10E3/uL Final    ABS. EOSINOPHILS 10/14/2020 0.1  0.0 - 0.4 x10E3/uL Final    ABS. BASOPHILS 10/14/2020 0.0  0.0 - 0.2 x10E3/uL Final    IMMATURE GRANULOCYTES 10/14/2020 0  Not Estab. % Final    ABS. IMM. GRANS. 10/14/2020 0.0  0.0 - 0.1 x10E3/uL Final    Glucose 10/14/2020 84  65 - 99 mg/dL Final    BUN 10/14/2020 9  6 - 24 mg/dL Final    Creatinine 10/14/2020 1.09  0.76 - 1.27 mg/dL Final    GFR est non-AA 10/14/2020 79  >59 mL/min/1.73 Final    GFR est AA 10/14/2020 91  >59 mL/min/1.73 Final    BUN/Creatinine ratio 10/14/2020 8* 9 - 20 Final    Sodium 10/14/2020 140  134 - 144 mmol/L Final    Potassium 10/14/2020 4.1  3.5 - 5.2 mmol/L Final    Chloride 10/14/2020 109* 96 - 106 mmol/L Final    CO2 10/14/2020 18* 20 - 29 mmol/L Final    Calcium 10/14/2020 8.8  8.7 - 10.2 mg/dL Final    Protein, total 10/14/2020 6.7  6.0 - 8.5 g/dL Final    Albumin 10/14/2020 4.2  4.0 - 5.0 g/dL Final    GLOBULIN, TOTAL 10/14/2020 2.5  1.5 - 4.5 g/dL Final    A-G Ratio 10/14/2020 1.7  1.2 - 2.2 Final    Bilirubin, total 10/14/2020 0.4  0.0 - 1.2 mg/dL Final    Alk.  phosphatase 10/14/2020 65  39 - 117 IU/L Final    AST (SGOT) 10/14/2020 18  0 - 40 IU/L Final    ALT (SGPT) 10/14/2020 23  0 - 44 IU/L Final    Vitamin B12 10/14/2020 640  232 - 1,245 pg/mL Final    Vitamin B6 10/14/2020 15.9  5.3 - 46.7 ug/L Final MRI of the cervical spine completed January 27 2021 [noted in care everywhere tab]    IMPRESSION:    1. C5-C6 right uncovertebral joint hypertrophy may affect the right C6 nerve. 2. C3-C4 mild central spinal canal stenosis. 3. Anterior fusion C4-C7. 4. Normal cervical spinal cord. Results from [place] encounter on 12/17/19    MRI BRAIN W WO CONT    Impression  IMPRESSION:    1. No acute or significant intracranial abnormality. Results from [place] encounter on 08/20/19    MRI CERV SPINE WO CONT    Impression  IMPRESSION:    1. C5-C6 uncovertebral joint hypertrophy may affect the right C6 nerve. 2. 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 3. C3-C4 mild central spinal canal stenosis. 4. Normal cervical spinal cord. 5. Left thyroid lobe hypertrophy. See ultrasound thyroid report from last year. Results from [place] encounter on 09/29/16    MRI LUMB SPINE WO CONT    Impression  Impression:  Severe degenerative disc disease at L5-S1. Remote left laminotomy at L5-S1. Small disc bulge with superimposed small central disc protrusion at this level. Mildly effaced left lateral recess. No neuroforaminal narrowing or spinal canal  stenosis. Allergies   Allergen Reactions    Contrast Dye [Iodine] Hives    Lyrica [Pregabalin] Other (comments)     Behavior change       Current Outpatient Medications   Medication Sig    topiramate ER (Trokendi XR) 100 mg capsule Take 1 Capsule by mouth daily. Indications: migraine prevention    galcanezumab-gnlm (Emgality Pen) 120 mg/mL injection 1 mL by SubCUTAneous route every thirty (30) days. Indications: migraine prevention    ibuprofen (MOTRIN) 800 mg tablet Take 1 Tab by mouth every six (6) hours as needed for Pain.  propranolol LA (INDERAL LA) 60 mg SR capsule TAKE 1 CAPSULE BY MOUTH EVERY DAY    simvastatin (ZOCOR) 10 mg tablet 10 mg nightly.     cholecalciferol, vitamin D3, (VITAMIN D3) 2,000 unit tab Take  by mouth daily.  rizatriptan (MAXALT) 10 mg tablet Take 1 Tab by mouth every eight (8) hours as needed for Migraine. May repeat in 2 hours if needed     No current facility-administered medications for this visit. Past medical history/surgical history, family history, and social history have been reviewed for today's visit      ROS    A ten system review of constitutional, cardiovascular, respiratory, musculoskeletal, endocrine, skin, SHEENT, genitourinary, psychiatric and neurologic systems was obtained and is unremarkable except as mentioned under HPI          EXAMINATION:     Visit Vitals  /80 (BP 1 Location: Left arm, BP Patient Position: Sitting, BP Cuff Size: Adult)   Pulse 67   Resp 16   Ht 5' 10\" (1.778 m)   Wt 213 lb (96.6 kg)   SpO2 99%   BMI 30.56 kg/m²         General appearance: Patient is well-developed and well-nourished in no apparent distress and well groomed. Psych/mental health:  Affect: Appropriate    PHQ  3 most recent PHQ Screens 8/11/2021   PHQ Not Done -   Little interest or pleasure in doing things Not at all   Feeling down, depressed, irritable, or hopeless Not at all   Total Score PHQ 2 0       HEENT:   Normocephalic  With evidence of trauma: No  Full range of motion head neck: Yes  Tenderness to palpation of the head neck region: No      Cardiovascular:     Extremities warm to touch: Yes  Extremity swelling: No  Discoloration: No  Evidence of PVD: No    Respiratory:   Dyspnea on exertion: No   Abnormal effort on casual observation: No   Use of portable oxygen: No   Evidence of cyanosis: No     Musculoskeletal:   Evidence of significant bone deformities: No   Spinal curvature: No     Integumentary:    Obvious bruising: No   Lacerations or discoloration on casual observation: No       Neurological Examination:   Mental Status:        MMSE  No flowsheet data found.      Formal testing was not completed    there was nothing concerning on general observation and discussion. Alert oriented and appropriate to general conversation  Normal processing on general observation  Followed conversation and responded seemingly appropriate throughout the office visit  No word finding difficulties noted on casual observation  Able to follow directions without difficulty     Cranial Nerves:    Grossly intact    Motor:   Normal bulk  No tremor appreciated on today's exam  No abnormal movements appreciated on today's exam  Moves extremities spontaneously and with purpose  5/5 x 4      Sensation: Intact to light touch    Coordination/Cerebellar:   Grossly intact    Gait: Ambulates independently    Reflexes: Symmetrical    Fall risk assessment  No flowsheet data found. Follow-up and Dispositions    · Return in about 6 months (around 2/11/2022) for In office appointment.            Sherrie Schafer MS, ANP-BC, [facility]

## 2021-08-11 NOTE — PROGRESS NOTES
Chief Complaint   Patient presents with    Follow-up    Migraine     last three months he has had 5, 3, and 5, can't distinguish the different headache vs migraine

## 2021-08-11 NOTE — PATIENT INSTRUCTIONS
As per discussion  Continue on Emgality  If you continue to have 5 headaches a month or more I want you to go back on the Trokendi 100 mg  A prescription has been sent to your pharmacy so it should be available for pickup should you need it  If you restarted start with 1 tablet every other day for 2 weeks and then go up to 1 full tablet daily  You can get co-pay assistance coupons of Trokendi's website should you have a significant co-pay    Continue with the Maxalt as needed for breakthrough headache    Overall from a neurologic perspective you are doing better    Hopefully once you are fully recovered from his shoulder surgery you will be feeling pretty healthy :-)    If you need to get a hold of me before your next office visit my chart is most efficient method of communication    Office Policies    o Phone calls/patient messages:  Please allow up to 24 hours for someone in the office to contact you about your call or message. Be mindful your provider may be out of the office or your message may require further review. We encourage you to use [facility] for your messages as this is a faster, more efficient way to communicate with our office    o Medication Refills:  Prescription medications require up to 48 business hours to process. We encourage you to use [facility] for your refills. For controlled medications: Please allow up to 72 business hours to process. Certain medications may require you to  a written prescription at our office. NO narcotic/controlled medications will be prescribed after 4pm Monday through Friday or on weekends    o Form/Paperwork Completion:  We ask that you allow 7-14 business days. You may also download your forms to [facility] [employer] to have your doctor print off.

## 2021-08-11 NOTE — LETTER
8/11/2021    Patient: Lenny Garcia   YOB: 1970   Date of Visit: 8/11/2021     Christopher Ng MD  [address] [place] [id number]  Via Fax: [phone]    Dear Christopher Ng MD,      Thank you for referring Mr. Loco Dougherty to [address] [place] [address] for evaluation. My notes for this consultation are attached. If you have questions, please do not hesitate to call me. I look forward to following your patient along with you.       Sincerely,    Cait Cornejo NP

## 2022-02-07 ENCOUNTER — HOSPITAL ENCOUNTER (EMERGENCY)
Age: 52
Discharge: HOME | End: 2022-02-07
Attending: EMERGENCY MEDICINE
Payer: COMMERCIAL

## 2022-02-07 ENCOUNTER — APPOINTMENT (OUTPATIENT)
Dept: GENERAL RADIOLOGY | Age: 52
End: 2022-02-07
Attending: PHYSICIAN ASSISTANT
Payer: COMMERCIAL

## 2022-02-07 VITALS
RESPIRATION RATE: 16 BRPM | DIASTOLIC BLOOD PRESSURE: 81 MMHG | WEIGHT: 196 LBS | TEMPERATURE: 97.9 F | BODY MASS INDEX: 28.06 KG/M2 | OXYGEN SATURATION: 100 % | SYSTOLIC BLOOD PRESSURE: 119 MMHG | HEIGHT: 70 IN | HEART RATE: 64 BPM

## 2022-02-07 DIAGNOSIS — R07.9 ACUTE CHEST PAIN: Primary | ICD-10-CM

## 2022-02-07 DIAGNOSIS — K21.9 GASTROESOPHAGEAL REFLUX DISEASE, UNSPECIFIED WHETHER ESOPHAGITIS PRESENT: ICD-10-CM

## 2022-02-07 LAB
ALBUMIN SERPL-MCNC: 3.8 G/DL (ref 3.5–5)
ALBUMIN/GLOB SERPL: 1.1 {RATIO} (ref 1.1–2.2)
ALP SERPL-CCNC: 64 U/L (ref 45–117)
ALT SERPL-CCNC: 35 U/L (ref 12–78)
ANION GAP SERPL CALC-SCNC: 4 MMOL/L (ref 5–15)
AST SERPL-CCNC: 29 U/L (ref 15–37)
ATRIAL RATE: 69 BPM
BASOPHILS # BLD: 0 K/UL (ref 0–0.1)
BASOPHILS NFR BLD: 1 % (ref 0–1)
BILIRUB SERPL-MCNC: 0.4 MG/DL (ref 0.2–1)
BUN SERPL-MCNC: 16 MG/DL (ref 6–20)
BUN/CREAT SERPL: 15 (ref 12–20)
CALCIUM SERPL-MCNC: 9.2 MG/DL (ref 8.5–10.1)
CALCULATED P AXIS, ECG09: 39 DEGREES
CALCULATED R AXIS, ECG10: 73 DEGREES
CALCULATED T AXIS, ECG11: 50 DEGREES
CHLORIDE SERPL-SCNC: 105 MMOL/L (ref 97–108)
CO2 SERPL-SCNC: 29 MMOL/L (ref 21–32)
COMMENT, HOLDF: NORMAL
CREAT SERPL-MCNC: 1.04 MG/DL (ref 0.7–1.3)
DIAGNOSIS, 93000: NORMAL
DIFFERENTIAL METHOD BLD: NORMAL
EOSINOPHIL # BLD: 0.2 K/UL (ref 0–0.4)
EOSINOPHIL NFR BLD: 3 % (ref 0–7)
ERYTHROCYTE [DISTWIDTH] IN BLOOD BY AUTOMATED COUNT: 12.5 % (ref 11.5–14.5)
GLOBULIN SER CALC-MCNC: 3.5 G/DL (ref 2–4)
GLUCOSE SERPL-MCNC: 98 MG/DL (ref 65–100)
HCT VFR BLD AUTO: 44.6 % (ref 36.6–50.3)
HGB BLD-MCNC: 14.7 G/DL (ref 12.1–17)
IMM GRANULOCYTES # BLD AUTO: 0 K/UL (ref 0–0.04)
IMM GRANULOCYTES NFR BLD AUTO: 0 % (ref 0–0.5)
LYMPHOCYTES # BLD: 1.7 K/UL (ref 0.8–3.5)
LYMPHOCYTES NFR BLD: 25 % (ref 12–49)
MCH RBC QN AUTO: 30.7 PG (ref 26–34)
MCHC RBC AUTO-ENTMCNC: 33 G/DL (ref 30–36.5)
MCV RBC AUTO: 93.1 FL (ref 80–99)
MONOCYTES # BLD: 0.5 K/UL (ref 0–1)
MONOCYTES NFR BLD: 7 % (ref 5–13)
NEUTS SEG # BLD: 4.5 K/UL (ref 1.8–8)
NEUTS SEG NFR BLD: 64 % (ref 32–75)
NRBC # BLD: 0 K/UL (ref 0–0.01)
NRBC BLD-RTO: 0 PER 100 WBC
P-R INTERVAL, ECG05: 132 MS
PLATELET # BLD AUTO: 233 K/UL (ref 150–400)
PMV BLD AUTO: 10.2 FL (ref 8.9–12.9)
POTASSIUM SERPL-SCNC: 4.9 MMOL/L (ref 3.5–5.1)
PROT SERPL-MCNC: 7.3 G/DL (ref 6.4–8.2)
Q-T INTERVAL, ECG07: 380 MS
QRS DURATION, ECG06: 82 MS
QTC CALCULATION (BEZET), ECG08: 407 MS
RBC # BLD AUTO: 4.79 M/UL (ref 4.1–5.7)
SAMPLES BEING HELD,HOLD: NORMAL
SODIUM SERPL-SCNC: 138 MMOL/L (ref 136–145)
TROPONIN-HIGH SENSITIVITY: 6 NG/L (ref 0–76)
VENTRICULAR RATE, ECG03: 69 BPM
WBC # BLD AUTO: 6.9 K/UL (ref 4.1–11.1)

## 2022-02-07 PROCEDURE — 71045 X-RAY EXAM CHEST 1 VIEW: CPT

## 2022-02-07 PROCEDURE — 80053 COMPREHEN METABOLIC PANEL: CPT

## 2022-02-07 PROCEDURE — 84484 ASSAY OF TROPONIN QUANT: CPT

## 2022-02-07 PROCEDURE — 36415 COLL VENOUS BLD VENIPUNCTURE: CPT

## 2022-02-07 PROCEDURE — 99281 EMR DPT VST MAYX REQ PHY/QHP: CPT

## 2022-02-07 PROCEDURE — 85025 COMPLETE CBC W/AUTO DIFF WBC: CPT

## 2022-02-07 PROCEDURE — 93005 ELECTROCARDIOGRAM TRACING: CPT

## 2022-02-07 RX ORDER — ALUMINA, MAGNESIA, AND SIMETHICONE 2400; 2400; 240 MG/30ML; MG/30ML; MG/30ML
10 SUSPENSION ORAL
Qty: 150 ML | Refills: 0 | Status: SHIPPED | OUTPATIENT
Start: 2022-02-07 | End: 2022-04-27

## 2022-02-07 NOTE — ED PROVIDER NOTES
EMERGENCY DEPARTMENT HISTORY AND PHYSICAL EXAM      Date: 2/7/2022  Patient Name: Gera Montalvo    History of Presenting Illness     Chief Complaint   Patient presents with    Chest Pain     midsternal chest pain with sob and diaphoresis x 15 min this afternoon. He is still symptomatic, but is much better       History Provided By: Patient    HPI: Gera Montalvo, 46 y.o. male history of migraine, high cholesterol and others as below presents ambulatory with his wife to the ED with cc of an episode of moderately severe and much improved chest pain and pressure that happened earlier while he was sitting watching television. He tells me he has had the symptoms in the past but never quite as intense. He tells me he does have a history of GERD and did see his primary care about 2 weeks ago and was started on Prilosec. He tells me that the Prilosec does seem to help especially during the day, but in the night his GERD symptoms worsen when he lays down. There has been no nausea or vomiting. He has had no diarrhea or constipation but does endorse some occasional loose stool that he attributes to changing his diet in order to lose weight. He denies any significant cardiac history. He does not smoke cigarettes. He tells me symptoms are lot better since arriving to the emergency department. There are no other complaints, changes, or physical findings at this time. PCP: Meño Wolf MD    Current Outpatient Medications   Medication Sig Dispense Refill    aluminum & magnesium hydroxide-simethicone (Maalox Maximum Strength) 400-400-40 mg/5 mL suspension Take 10 mL by mouth every six (6) hours as needed for Indigestion. 150 mL 0    topiramate ER (Trokendi XR) 100 mg capsule Take 1 Capsule by mouth daily. Indications: migraine prevention 90 Capsule 2    galcanezumab-gnlm (Emgality Pen) 120 mg/mL injection 1 mL by SubCUTAneous route every thirty (30) days.  Indications: migraine prevention 1 mL 11    ibuprofen (MOTRIN) 800 mg tablet Take 1 Tab by mouth every six (6) hours as needed for Pain. 60 Tab 0    propranolol LA (INDERAL LA) 60 mg SR capsule TAKE 1 CAPSULE BY MOUTH EVERY DAY      simvastatin (ZOCOR) 10 mg tablet 10 mg nightly.  cholecalciferol, vitamin D3, (VITAMIN D3) 2,000 unit tab Take  by mouth daily.  rizatriptan (MAXALT) 10 mg tablet Take 1 Tab by mouth every eight (8) hours as needed for Migraine. May repeat in 2 hours if needed 12 Tab 11     Past History     Past Medical History:  Past Medical History:   Diagnosis Date    Back pain     Hypercholesteremia     Migraine 2009    Neck pain        Past Surgical History:  Past Surgical History:   Procedure Laterality Date    HX APPENDECTOMY  1990    HX CERVICAL LAMINECTOMY      HX CERVICAL LAMINECTOMY  2016    HX ORTHOPAEDIC      Neck and back surgeries: 2008, 2014, 2016    HX SHOULDER ARTHROSCOPY  2021    HX TUMOR REMOVAL  2003    Fatty tumors       Family History:  Family History   Problem Relation Age of Onset    Cancer Mother         uterine    Other Father         ALS    Stroke Maternal Grandfather     Cancer Maternal Grandfather     Dementia Paternal Grandmother     Dementia Paternal Grandfather     Cancer Maternal Grandmother         breast       Social History:  Social History     Tobacco Use    Smoking status: Never Smoker    Smokeless tobacco: Never Used   Vaping Use    Vaping Use: Never used   Substance Use Topics    Alcohol use: Not Currently    Drug use: Never       Allergies: Allergies   Allergen Reactions    Contrast Dye [Iodine] Hives    Lyrica [Pregabalin] Other (comments)     Behavior change     Review of Systems   Review of Systems   Constitutional: Negative for fever. Respiratory: Negative for cough and shortness of breath. Cardiovascular: Positive for chest pain (Pain and pressure). Gastrointestinal: Negative for abdominal pain, nausea and vomiting.    All other systems reviewed and are negative. Physical Exam   Physical Exam  Vitals and nursing note reviewed. Constitutional:       General: He is not in acute distress. Appearance: He is well-developed. He is not toxic-appearing. HENT:      Head: Normocephalic and atraumatic. No right periorbital erythema or left periorbital erythema. Right Ear: External ear normal.      Left Ear: External ear normal.      Nose: Nose normal.      Mouth/Throat:      Lips: No lesions. Eyes:      General: No scleral icterus. Conjunctiva/sclera: Conjunctivae normal.      Pupils: Pupils are equal, round, and reactive to light. Cardiovascular:      Rate and Rhythm: Normal rate. Pulmonary:      Effort: Pulmonary effort is normal. No respiratory distress. Comments:   Breathing is unlabored and lungs are clear to auscultation throughout  Chest:      Chest wall: No tenderness. Comments:   No chest wall tenderness with firm palpation  Abdominal:      General: There is no distension. Palpations: Abdomen is not rigid. Tenderness: There is no guarding. Comments:   Soft  Deep palpation elicits no grimace or guarding  Nontender   Musculoskeletal:         General: Normal range of motion. Cervical back: Normal range of motion. Skin:     Findings: No rash. Neurological:      Mental Status: He is alert and oriented to person, place, and time. He is not disoriented. Cranial Nerves: No cranial nerve deficit. Sensory: No sensory deficit.    Psychiatric:         Speech: Speech normal.       Diagnostic Study Results     Labs -     Recent Results (from the past 12 hour(s))   EKG, 12 LEAD, INITIAL    Collection Time: 02/07/22  3:22 PM   Result Value Ref Range    Ventricular Rate 69 BPM    Atrial Rate 69 BPM    P-R Interval 132 ms    QRS Duration 82 ms    Q-T Interval 380 ms    QTC Calculation (Bezet) 407 ms    Calculated P Axis 39 degrees    Calculated R Axis 73 degrees    Calculated T Axis 50 degrees    Diagnosis Normal sinus rhythm  Normal ECG  No previous ECGs available     METABOLIC PANEL, COMPREHENSIVE    Collection Time: 02/07/22  3:50 PM   Result Value Ref Range    Sodium 138 136 - 145 mmol/L    Potassium 4.9 3.5 - 5.1 mmol/L    Chloride 105 97 - 108 mmol/L    CO2 29 21 - 32 mmol/L    Anion gap 4 (L) 5 - 15 mmol/L    Glucose 98 65 - 100 mg/dL    BUN 16 6 - 20 MG/DL    Creatinine 1.04 0.70 - 1.30 MG/DL    BUN/Creatinine ratio 15 12 - 20      GFR est AA >60 >60 ml/min/1.73m2    GFR est non-AA >60 >60 ml/min/1.73m2    Calcium 9.2 8.5 - 10.1 MG/DL    Bilirubin, total 0.4 0.2 - 1.0 MG/DL    ALT (SGPT) 35 12 - 78 U/L    AST (SGOT) 29 15 - 37 U/L    Alk. phosphatase 64 45 - 117 U/L    Protein, total 7.3 6.4 - 8.2 g/dL    Albumin 3.8 3.5 - 5.0 g/dL    Globulin 3.5 2.0 - 4.0 g/dL    A-G Ratio 1.1 1.1 - 2.2     TROPONIN-HIGH SENSITIVITY    Collection Time: 02/07/22  3:50 PM   Result Value Ref Range    Troponin-High Sensitivity 6 0 - 76 ng/L   CBC WITH AUTOMATED DIFF    Collection Time: 02/07/22  4:47 PM   Result Value Ref Range    WBC 6.9 4.1 - 11.1 K/uL    RBC 4.79 4.10 - 5.70 M/uL    HGB 14.7 12.1 - 17.0 g/dL    HCT 44.6 36.6 - 50.3 %    MCV 93.1 80.0 - 99.0 FL    MCH 30.7 26.0 - 34.0 PG    MCHC 33.0 30.0 - 36.5 g/dL    RDW 12.5 11.5 - 14.5 %    PLATELET 202 052 - 890 K/uL    MPV 10.2 8.9 - 12.9 FL    NRBC 0.0 0  WBC    ABSOLUTE NRBC 0.00 0.00 - 0.01 K/uL    NEUTROPHILS 64 32 - 75 %    LYMPHOCYTES 25 12 - 49 %    MONOCYTES 7 5 - 13 %    EOSINOPHILS 3 0 - 7 %    BASOPHILS 1 0 - 1 %    IMMATURE GRANULOCYTES 0 0.0 - 0.5 %    ABS. NEUTROPHILS 4.5 1.8 - 8.0 K/UL    ABS. LYMPHOCYTES 1.7 0.8 - 3.5 K/UL    ABS. MONOCYTES 0.5 0.0 - 1.0 K/UL    ABS. EOSINOPHILS 0.2 0.0 - 0.4 K/UL    ABS. BASOPHILS 0.0 0.0 - 0.1 K/UL    ABS. IMM.  GRANS. 0.0 0.00 - 0.04 K/UL    DF AUTOMATED     SAMPLES BEING HELD    Collection Time: 02/07/22  4:47 PM   Result Value Ref Range    SAMPLES BEING HELD SST     COMMENT        Add-on orders for these samples will be processed based on acceptable specimen integrity and analyte stability, which may vary by analyte. Radiologic Studies -   XR CHEST PORT   Final Result   Minimal atelectasis or infiltrate left lung base. CT Results  (Last 48 hours)    None        CXR Results  (Last 48 hours)               02/07/22 1801  XR CHEST PORT Final result    Impression:  Minimal atelectasis or infiltrate left lung base. Narrative:  Clinical indication: Chest pain. Portable AP view of the chest obtained. No prior. There is some minimal   infiltrates or atelectasis at the left lung base which shallow inspiration. The   right lung is clear. The heart size is normal. Prior cervical spine surgery. Medical Decision Making   I am the first provider for this patient. I reviewed the vital signs, available nursing notes, past medical history, past surgical history, family history and social history. Vital Signs-Reviewed the patient's vital signs. Patient Vitals for the past 12 hrs:   Temp Pulse Resp BP SpO2   02/07/22 1535 97.9 °F (36.6 °C) 64 16 119/81 100 %       Pulse Oximetry Analysis - 100% on RA    Records Reviewed: Nursing Notes, Old Medical Records, Previous Radiology Studies and Previous Laboratory Studies    Provider Notes (Medical Decision Making):   DDx: ACS, GERD, cholecystitis, costochondritis, pancreatitis, PERC rule negative and would not pursue testing for PE    Afebrile and well-appearing. Asymptomatic at the time of my exam.  Patient presents with his wife following an episode of substernal chest pain that started at rest and resolved on its own. He has a history of GERD. EKG is NSR and high-sensitivity troponin is not elevated. Breathing is unlabored and lungs are clear. Chest x-ray did does suggest mild atelectasis of the left lower base. Presentation is not consistent with a lower respiratory infection given the absence of fever, cough, malaise and other symptoms. Abdomen is soft and nontender. Additional testing deferred. Given history of GERD, will refer patient to gastroenterology. Given presentation for chest pain, believe appropriate to refer to cardiology. Return precautions for worsening symptoms or any concerns. ED Course:   Initial assessment performed. The patients presenting problems have been discussed, and they are in agreement with the care plan formulated and outlined with them. I have encouraged them to ask questions as they arise throughout their visit. Disposition:  Discharge    PLAN:  1. Discharge Medication List as of 2/7/2022  6:24 PM      START taking these medications    Details   aluminum & magnesium hydroxide-simethicone (Maalox Maximum Strength) 400-400-40 mg/5 mL suspension Take 10 mL by mouth every six (6) hours as needed for Indigestion. , Normal, Disp-150 mL, R-0         CONTINUE these medications which have NOT CHANGED    Details   topiramate ER (Trokendi XR) 100 mg capsule Take 1 Capsule by mouth daily. Indications: migraine prevention, Normal, Disp-90 Capsule, R-2      galcanezumab-gnlm (Emgality Pen) 120 mg/mL injection 1 mL by SubCUTAneous route every thirty (30) days. Indications: migraine prevention, Normal, Disp-1 mL, R-11      ibuprofen (MOTRIN) 800 mg tablet Take 1 Tab by mouth every six (6) hours as needed for Pain., No Print, Disp-60 Tab,R-0      propranolol LA (INDERAL LA) 60 mg SR capsule TAKE 1 CAPSULE BY MOUTH EVERY DAY, Historical Med      simvastatin (ZOCOR) 10 mg tablet 10 mg nightly., Historical Med      cholecalciferol, vitamin D3, (VITAMIN D3) 2,000 unit tab Take  by mouth daily. , Historical Med      rizatriptan (MAXALT) 10 mg tablet Take 1 Tab by mouth every eight (8) hours as needed for Migraine. May repeat in 2 hours if needed, Normal, Disp-12 Tab, R-11           2.    Follow-up Information     Follow up With Specialties Details Why Griselda Salcedo MD Gastroenterology Call GASTROENTEROLOGY: as needed regarding your GERD symptoms [id number] [place]  [place] [address] [facility]  [phone]      Lizy Giles MD Cardio Vascular Surgery, Cardiology, Interventional Cardiology Call  CARDIOLOGY: as needed for any concerns [address]       Smith Morrow MD Family Medicine Call  PRIMARY CARE: regarding your ER visit [address] [address] [phone]          Return to ED if worse     Diagnosis     Clinical Impression:   1. Acute chest pain    2.  Gastroesophageal reflux disease, unspecified whether esophagitis present

## 2022-03-14 ENCOUNTER — TRANSCRIBE ORDER (OUTPATIENT)
Dept: SCHEDULING | Age: 52
End: 2022-03-14

## 2022-03-14 DIAGNOSIS — Z12.11 COLON CANCER SCREENING: ICD-10-CM

## 2022-03-14 DIAGNOSIS — R10.13 DYSPEPSIA: ICD-10-CM

## 2022-03-14 DIAGNOSIS — R07.9 CHEST PAIN: Primary | ICD-10-CM

## 2022-03-18 PROBLEM — G44.86 CERVICOGENIC HEADACHE: Status: ACTIVE | Noted: 2020-01-07

## 2022-03-19 PROBLEM — G44.209 MIXED COMMON MIGRAINE AND MUSCLE CONTRACTION HEADACHE: Status: ACTIVE | Noted: 2020-01-07

## 2022-03-19 PROBLEM — I67.89 CEREBRAL MICROVASCULAR DISEASE: Status: ACTIVE | Noted: 2020-01-07

## 2022-03-19 PROBLEM — M96.1 CERVICAL POST-LAMINECTOMY SYNDROME: Status: ACTIVE | Noted: 2020-01-07

## 2022-03-19 PROBLEM — G43.009 MIXED COMMON MIGRAINE AND MUSCLE CONTRACTION HEADACHE: Status: ACTIVE | Noted: 2020-01-07

## 2022-04-01 ENCOUNTER — HOSPITAL ENCOUNTER (OUTPATIENT)
Dept: ULTRASOUND IMAGING | Age: 52
Discharge: HOME | End: 2022-04-01
Attending: INTERNAL MEDICINE
Payer: COMMERCIAL

## 2022-04-01 ENCOUNTER — HOSPITAL ENCOUNTER (OUTPATIENT)
Dept: GENERAL RADIOLOGY | Age: 52
Discharge: HOME | End: 2022-04-01
Attending: INTERNAL MEDICINE
Payer: COMMERCIAL

## 2022-04-01 DIAGNOSIS — Z12.11 COLON CANCER SCREENING: ICD-10-CM

## 2022-04-01 DIAGNOSIS — R10.13 DYSPEPSIA: ICD-10-CM

## 2022-04-01 DIAGNOSIS — R07.9 CHEST PAIN: ICD-10-CM

## 2022-04-01 PROCEDURE — 74220 X-RAY XM ESOPHAGUS 1CNTRST: CPT

## 2022-04-01 PROCEDURE — 76700 US EXAM ABDOM COMPLETE: CPT

## 2022-04-05 DIAGNOSIS — G43.009 MIXED COMMON MIGRAINE AND MUSCLE CONTRACTION HEADACHE: ICD-10-CM

## 2022-04-05 DIAGNOSIS — M96.1 CERVICAL POST-LAMINECTOMY SYNDROME: ICD-10-CM

## 2022-04-05 DIAGNOSIS — G44.209 MIXED COMMON MIGRAINE AND MUSCLE CONTRACTION HEADACHE: ICD-10-CM

## 2022-04-05 DIAGNOSIS — G44.86 CERVICOGENIC HEADACHE: ICD-10-CM

## 2022-04-05 RX ORDER — RIZATRIPTAN BENZOATE 10 MG/1
10 TABLET ORAL
Qty: 12 TABLET | Refills: 11 | Status: SHIPPED | OUTPATIENT
Start: 2022-04-05

## 2022-04-13 ENCOUNTER — OFFICE VISIT (OUTPATIENT)
Dept: CARDIOLOGY CLINIC | Age: 52
End: 2022-04-13
Payer: COMMERCIAL

## 2022-04-13 VITALS
RESPIRATION RATE: 16 BRPM | WEIGHT: 198.1 LBS | SYSTOLIC BLOOD PRESSURE: 110 MMHG | BODY MASS INDEX: 28.36 KG/M2 | OXYGEN SATURATION: 96 % | DIASTOLIC BLOOD PRESSURE: 70 MMHG | HEIGHT: 70 IN | HEART RATE: 84 BPM

## 2022-04-13 DIAGNOSIS — R07.2 PRECORDIAL CHEST PAIN: Primary | ICD-10-CM

## 2022-04-13 DIAGNOSIS — K21.9 GASTROESOPHAGEAL REFLUX DISEASE, UNSPECIFIED WHETHER ESOPHAGITIS PRESENT: ICD-10-CM

## 2022-04-13 DIAGNOSIS — E78.00 HYPERCHOLESTEREMIA: ICD-10-CM

## 2022-04-13 PROCEDURE — 93000 ELECTROCARDIOGRAM COMPLETE: CPT | Performed by: INTERNAL MEDICINE

## 2022-04-13 PROCEDURE — 99204 OFFICE O/P NEW MOD 45 MIN: CPT | Performed by: INTERNAL MEDICINE

## 2022-04-13 RX ORDER — SIMETHICONE 125 MG
125 CAPSULE ORAL
COMMUNITY

## 2022-04-13 NOTE — PROGRESS NOTES
[address] [place] [address]  [phone]     Subjective:      Dinesh Jones is a 46 y.o. male is here for a new patient visit, having been to the emergency department on February 7, 2022 with the following HPI:    \"50 y.o. male history of migraine, high cholesterol and others as below presents ambulatory with his wife to the ED with cc of an episode of moderately severe and much improved chest pain and pressure that happened earlier while he was sitting watching television. He tells me he has had the symptoms in the past but never quite as intense. He tells me he does have a history of GERD and did see his primary care about 2 weeks ago and was started on Prilosec. He tells me that the Prilosec does seem to help especially during the day, but in the night his GERD symptoms worsen when he lays down. There has been no nausea or vomiting. He has had no diarrhea or constipation but does endorse some occasional loose stool that he attributes to changing his diet in order to lose weight. He denies any significant cardiac history. He does not smoke cigarettes. He tells me symptoms are lot better since arriving to the emergency department. \"  ER evaluation was negative. He has seen gastroenterology and is scheduled for endoscopy with Dr. Yasmeen Bianchi. Today, the patient still has some indigestion type symptoms especially lying down, but denies exertional chest pain/ shortness of breath, orthopnea, PND, LE edema, palpitations, syncope, or presyncope. He walks up to 5 miles a day with his dog and feels fine with that.     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    Cerebral microvascular disease 01/07/2020    Migraine 2009      Dhara Hernandez MD  Past Medical History:   Diagnosis Date    Back pain     Hypercholesteremia     Migraine 2009    Neck pain       Past Surgical History:   Procedure Laterality Date    HX APPENDECTOMY  1990    HX CERVICAL LAMINECTOMY      HX CERVICAL LAMINECTOMY  2016    HX ORTHOPAEDIC      Neck and back surgeries: 2008, 2014, 2016    HX SHOULDER ARTHROSCOPY  2021    HX TUMOR REMOVAL  2003    Fatty tumors     Allergies   Allergen Reactions    Contrast Dye [Iodine] Hives    Lyrica [Pregabalin] Other (comments)     Behavior change      Family History   Problem Relation Age of Onset    Cancer Mother         uterine    Other Father         ALS    Stroke Maternal Grandfather     Cancer Maternal Grandfather     Dementia Paternal Grandmother     Dementia Paternal Grandfather     Cancer Maternal Grandmother         breast      Social History     Socioeconomic History    Marital status:      Spouse name: Not on file    Number of children: Not on file    Years of education: Not on file    Highest education level: Not on file   Occupational History    Not on file   Tobacco Use    Smoking status: Never Smoker    Smokeless tobacco: Never Used   Vaping Use    Vaping Use: Never used   Substance and Sexual Activity    Alcohol use: Not Currently    Drug use: Never    Sexual activity: Yes     Partners: Female   Other Topics Concern    Not on file   Social History Narrative    Not on file     Social Determinants of Health     Financial Resource Strain:     Difficulty of Paying Living Expenses: Not on file   Food Insecurity:     Worried About Running Out of Food in the Last Year: Not on file    Micahel of Food in the Last Year: Not on file   Transportation Needs:     Lack of Transportation (Medical): Not on file    Lack of Transportation (Non-Medical):  Not on file   Physical Activity:     Days of Exercise per Week: Not on file    Minutes of Exercise per Session: Not on file   Stress:     Feeling of Stress : Not on file   Social Connections:     Frequency of Communication with Friends and Family: Not on file    Frequency of Social Gatherings with Friends and Family: Not on file    Attends [employer] Services: Not on file    Active Member of Clubs or Organizations: Not on file    Attends Club or Organization Meetings: Not on file    Marital Status: Not on file   Intimate Partner Violence:     Fear of Current or Ex-Partner: Not on file    Emotionally Abused: Not on file    Physically Abused: Not on file    Sexually Abused: Not on file   Housing Stability:     Unable to Pay for Housing in the Last Year: Not on file    Number of [place] in the Last Year: Not on file    Unstable Housing in the Last Year: Not on file      Current Outpatient Medications   Medication Sig    calcium carbonate (TUMS PO) Take  by mouth nightly as needed.  simethicone (GAS-X) 125 mg capsule Take 125 mg by mouth four (4) times daily as needed for Flatulence.  rizatriptan (MAXALT) 10 mg tablet Take 1 Tablet by mouth every eight (8) hours as needed for Migraine. May repeat in 2 hours if needed    galcanezumab-gnlm (Emgality Pen) 120 mg/mL injection 1 mL by SubCUTAneous route every thirty (30) days. Indications: migraine prevention    ibuprofen (MOTRIN) 800 mg tablet Take 1 Tab by mouth every six (6) hours as needed for Pain.  simvastatin (ZOCOR) 10 mg tablet 10 mg nightly.  cholecalciferol, vitamin D3, (VITAMIN D3) 2,000 unit tab Take  by mouth daily.  aluminum & magnesium hydroxide-simethicone (Maalox Maximum Strength) 400-400-40 mg/5 mL suspension Take 10 mL by mouth every six (6) hours as needed for Indigestion.  topiramate ER (Trokendi XR) 100 mg capsule Take 1 Capsule by mouth daily. Indications: migraine prevention    propranolol LA (INDERAL LA) 60 mg SR capsule TAKE 1 CAPSULE BY MOUTH EVERY DAY     No current facility-administered medications for this visit.          Review of Symptoms:  11 systems reviewed, negative other than as stated in the HPI    Physical ExamPhysical Exam:    Vitals:    04/13/22 1318   BP: 110/70   Pulse: 84   Resp: 16   SpO2: 96%   Weight: 198 lb 1.6 oz (89.9 kg)   Height: 5' 10\" (1.778 m)     Body mass index is 28.42 kg/m². General PE  Gen:  NAD  Mental Status - Alert. General Appearance - Not in acute distress. HEENT:  PERRL, no carotid bruits or JVD  Chest and Lung Exam   Inspection: Accessory muscles - No use of accessory muscles in breathing. Auscultation:   Breath sounds: - Normal.   Cardiovascular   Inspection: Jugular vein - Bilateral - Inspection Normal.   Palpation/Percussion:   Apical Impulse: - Normal.   Auscultation: Rhythm - Regular. Heart Sounds - S1 WNL and S2 WNL. No S3 or S4. Murmurs & Other Heart Sounds: Auscultation of the heart reveals - No Murmurs. Peripheral Vascular   Upper Extremity: Inspection - Bilateral - No Cyanotic nailbeds or Digital clubbing. Lower Extremity:   Palpation: Edema - Bilateral - No edema. Abdomen:   Soft, non-tender, bowel sounds are active. Neuro: A&O times 3, CN and motor grossly WNL    Labs:   No results found for: CHOL, CHOLX, CHLST, CHOLV, 570182, HDL, HDLP, LDL, LDLC, DLDLP, TGLX, TRIGL, TRIGP, CHHD, CHHDX  No results found for: CPK, CPKX, CPX  Lab Results   Component Value Date/Time    Sodium 138 02/07/2022 03:50 PM    Potassium 4.9 02/07/2022 03:50 PM    Chloride 105 02/07/2022 03:50 PM    CO2 29 02/07/2022 03:50 PM    Anion gap 4 (L) 02/07/2022 03:50 PM    Glucose 98 02/07/2022 03:50 PM    BUN 16 02/07/2022 03:50 PM    Creatinine 1.04 02/07/2022 03:50 PM    BUN/Creatinine ratio 15 02/07/2022 03:50 PM    GFR est AA >60 02/07/2022 03:50 PM    GFR est non-AA >60 02/07/2022 03:50 PM    Calcium 9.2 02/07/2022 03:50 PM    Bilirubin, total 0.4 02/07/2022 03:50 PM    Alk.  phosphatase 64 02/07/2022 03:50 PM    Protein, total 7.3 02/07/2022 03:50 PM    Albumin 3.8 02/07/2022 03:50 PM    Globulin 3.5 02/07/2022 03:50 PM    A-G Ratio 1.1 02/07/2022 03:50 PM    ALT (SGPT) 35 02/07/2022 03:50 PM       EKG:  NSR     Assessment:          ICD-10-CM ICD-9-CM    1. Precordial chest pain  R07.2 786.51 ECHO STRESS      CT HEART W/O CONT WITH CALCIUM   2. Hypercholesteremia  E78.00 272.0 AMB POC EKG ROUTINE W/ 12 LEADS, INTER & REP      ECHO STRESS      CT HEART W/O CONT WITH CALCIUM   3. Gastroesophageal reflux disease, unspecified whether esophagitis present  K21.9 530.81        Orders Placed This Encounter    CT HEART W/O CONT WITH CALCIUM     Standing Status:   Future     Standing Expiration Date:   5/13/2023     Scheduling Instructions:      Please schedule only after stress echo is verified to be normal     Order Specific Question:   Reason for Exam     Answer:   cv risk    AMB POC EKG ROUTINE W/ 12 LEADS, INTER & REP     Order Specific Question:   Reason for Exam:     Answer:   routine    calcium carbonate (TUMS PO)     Sig: Take  by mouth nightly as needed.  simethicone (GAS-X) 125 mg capsule     Sig: Take 125 mg by mouth four (4) times daily as needed for Flatulence. Plan:     46year-old with multiple cardiac risk factors and precordial chest discomfort, history of hyperlipidemia on statin. I will evaluate for ischemia with a stress echo, and for atherosclerotic coronary burden with a coronary calcium score if that is negative. If testing is normal, proceed with GI evaluation, healthy diet and exercise, and follow-up with me in 1 year.     Marcelo Boles MD

## 2022-04-13 NOTE — LETTER
4/13/2022    Patient: Yo Langley   YOB: 1970   Date of Visit: 4/13/2022     Kelley Retana MD  [address] [address]  Via Fax: [phone]    Dear Kelley Retana MD,      Thank you for referring Mr. Ferdinand Tyler to [facility] for evaluation. My notes for this consultation are attached. If you have questions, please do not hesitate to call me. I look forward to following your patient along with you.       Sincerely,    Meagan Lei MD

## 2022-04-13 NOTE — PROGRESS NOTES
Chief Complaint   Patient presents with    Referral / Consult     Chest discomfort    Cholesterol Problem     Patient C/O chest pressure and SOB relieved with 2 tums and gas with relief.

## 2022-04-21 ENCOUNTER — TELEPHONE (OUTPATIENT)
Dept: CARDIOLOGY CLINIC | Age: 52
End: 2022-04-21

## 2022-04-21 ENCOUNTER — ANCILLARY PROCEDURE (OUTPATIENT)
Dept: CARDIOLOGY CLINIC | Age: 52
End: 2022-04-21
Payer: COMMERCIAL

## 2022-04-21 VITALS
HEIGHT: 70 IN | SYSTOLIC BLOOD PRESSURE: 110 MMHG | DIASTOLIC BLOOD PRESSURE: 70 MMHG | WEIGHT: 198 LBS | BODY MASS INDEX: 28.35 KG/M2

## 2022-04-21 DIAGNOSIS — R07.2 PRECORDIAL CHEST PAIN: ICD-10-CM

## 2022-04-21 DIAGNOSIS — G43.719 INTRACTABLE CHRONIC MIGRAINE WITHOUT AURA AND WITHOUT STATUS MIGRAINOSUS: ICD-10-CM

## 2022-04-21 DIAGNOSIS — E78.00 HYPERCHOLESTEREMIA: ICD-10-CM

## 2022-04-21 LAB
STRESS ANGINA INDEX: 0
STRESS BASELINE DIAS BP: 82 MMHG
STRESS BASELINE HR: 92 BPM
STRESS BASELINE ST DEPRESSION: 0 MM
STRESS BASELINE SYS BP: 120 MMHG
STRESS ESTIMATED WORKLOAD: 4.6 METS
STRESS EXERCISE DUR MIN: 3 MIN
STRESS EXERCISE DUR SEC: 1 SEC
STRESS O2 SAT PEAK: 100 %
STRESS O2 SAT REST: 100 %
STRESS PEAK DIAS BP: 80 MMHG
STRESS PEAK SYS BP: 150 MMHG
STRESS PERCENT HR ACHIEVED: 121 %
STRESS POST PEAK HR: 203 BPM
STRESS RATE PRESSURE PRODUCT: NORMAL BPM*MMHG
STRESS STAGE 1 BP: NORMAL MMHG
STRESS STAGE 1 DURATION: NORMAL MIN:SEC
STRESS STAGE 1 HR: 196 BPM
STRESS STAGE 2 DURATION: NORMAL MIN:SEC
STRESS STAGE 2 HR: 196 BPM
STRESS TARGET HR: 168 BPM

## 2022-04-21 PROCEDURE — 93351 STRESS TTE COMPLETE: CPT | Performed by: INTERNAL MEDICINE

## 2022-04-21 RX ORDER — GALCANEZUMAB 120 MG/ML
INJECTION, SOLUTION SUBCUTANEOUS
Qty: 1 ML | Refills: 11 | Status: SHIPPED | OUTPATIENT
Start: 2022-04-21

## 2022-04-21 NOTE — PROGRESS NOTES
I notified the patient in person that his stress echo was negative for ischemia, but showed supraventricular tachycardia at a low level of exercise. Advised to restart his propranolol, monitor blood pressure and pulse with exercise, proceed with coronary calcium scoring as recommended, and follow-up with me in the office after that to reassess and consider options. Please just confirm appointment. Calcium score was already ordered. Thanks.

## 2022-04-21 NOTE — TELEPHONE ENCOUNTER
----- Message from Marcelo Boles MD sent at 4/21/2022  2:31 PM EDT -----  I notified the patient in person that his stress echo was negative for ischemia, but showed supraventricular tachycardia at a low level of exercise. Advised to restart his propranolol, monitor blood pressure and pulse with exercise, proceed with coronary calcium scoring as recommended, and follow-up with me in the office after that to reassess and consider options. Please just confirm appointment. Calcium score was already ordered. Thanks.

## 2022-04-22 ENCOUNTER — TELEPHONE (OUTPATIENT)
Dept: CARDIOLOGY CLINIC | Age: 52
End: 2022-04-22

## 2022-04-22 NOTE — TELEPHONE ENCOUNTER
Dr Yang Litter faxed a request from [phone] requesting cardiac clearance for EGD (date to be determined)  Please advise thanks

## 2022-04-23 ENCOUNTER — TELEPHONE (OUTPATIENT)
Dept: NEUROLOGY | Age: 52
End: 2022-04-23

## 2022-04-26 NOTE — TELEPHONE ENCOUNTER
Please advise the patient and Dr. Geneva Reynoso that as long as his heart rate is adequately controlled, i.e. under 120 bpm at rest with propranolol, he may proceed with EGD. He did have supraventricular tachycardia at a low level of exercise with his recent stress test, but that was not on any beta-blocker. His stress test was negative for ischemia and he tolerates the supraventricular tachycardia well. If he does have concerning cardiac symptoms or very fast heart rates despite propranolol, best to delay EGD and follow-up with me. Copy to Dr. Geneva Reynoso.

## 2022-04-27 ENCOUNTER — OFFICE VISIT (OUTPATIENT)
Dept: SURGERY | Age: 52
End: 2022-04-27
Payer: COMMERCIAL

## 2022-04-27 VITALS
SYSTOLIC BLOOD PRESSURE: 109 MMHG | WEIGHT: 194 LBS | HEIGHT: 70 IN | DIASTOLIC BLOOD PRESSURE: 73 MMHG | TEMPERATURE: 97.3 F | OXYGEN SATURATION: 99 % | BODY MASS INDEX: 27.77 KG/M2 | HEART RATE: 69 BPM | RESPIRATION RATE: 16 BRPM

## 2022-04-27 DIAGNOSIS — K80.20 GALLSTONES: Primary | ICD-10-CM

## 2022-04-27 PROCEDURE — 99204 OFFICE O/P NEW MOD 45 MIN: CPT | Performed by: SURGERY

## 2022-04-27 RX ORDER — OMEPRAZOLE 20 MG/1
20 CAPSULE, DELAYED RELEASE ORAL DAILY
COMMUNITY
Start: 2022-03-29

## 2022-04-27 NOTE — PROGRESS NOTES
HISTORY OF PRESENT ILLNESS  Rolanda Garcia is a 46 y.o. male. Undergoing work-up with Dr. Sybil Kumar for chest pain and dyspepsia. Some improvement with PPI. barium swallow: There is no hiatal hernia or gastroesophageal reflux. There is mild distal esophageal dysmotility. US: Gallstones, fatty liver    EGD was pending cardiac clearance with [facility]. He has cleared him.     Some of his symptoms worsen with eating  RUQ pain  occ nausea    ____________________________________________________________________________  Patient seen at the request of Dr. Sybil Kumar to evaluate the gallbladder    /73 (BP 1 Location: Left upper arm, BP Patient Position: Sitting, BP Cuff Size: Adult)   Pulse 69   Temp 97.3 °F (36.3 °C) (Temporal)   Resp 16   Ht 5' 10\" (1.778 m)   Wt 88 kg (194 lb)   SpO2 99%   BMI 27.84 kg/m²   Past Medical History:  No date: Back pain  No date: Hypercholesteremia  2009: Migraine  No date: Neck pain  Past Surgical History:  1990: HX APPENDECTOMY  No date: HX CERVICAL LAMINECTOMY  2016: HX CERVICAL LAMINECTOMY  No date: HX ORTHOPAEDIC      Comment:  Neck and back surgeries: 2008, 2014, 2016 2021: HX SHOULDER ARTHROSCOPY  2003: HX TUMOR REMOVAL      Comment:  Fatty tumors  No date: HX VASECTOMY  Social History    Socioeconomic History      Marital status:     Tobacco Use      Smoking status: Never Smoker      Smokeless tobacco: Never Used    Vaping Use      Vaping Use: Never used    Substance and Sexual Activity      Alcohol use: Not Currently      Drug use: Never      Sexual activity: Yes        Partners: Female    Review of patient's family history indicates:  Problem: Cancer      Relation: Mother          Age of Onset: (Not Specified)          Comment: uterine  Problem: Other      Relation: Father          Age of Onset: (Not Specified)          Comment: ALS  Problem: Stroke      Relation: Maternal Grandfather          Age of Onset: (Not Specified)  Problem: Cancer      Relation: Maternal Grandfather          Age of Onset: (Not Specified)  Problem: Dementia      Relation: Paternal Grandmother          Age of Onset: (Not Specified)  Problem: Dementia      Relation: Paternal Grandfather          Age of Onset: (Not Specified)  Problem: Cancer      Relation: Maternal Grandmother          Age of Onset: (Not Specified)          Comment: breast    Current Outpatient Medications:  omeprazole (PRILOSEC) 20 mg capsule, Take 20 mg by mouth daily. Emgality Pen 120 mg/mL injection, INJECT 1 MILLILITER SUBCUTANEOUSLY EVERY 30 DAYS  calcium carbonate (TUMS PO), Take  by mouth nightly as needed. simethicone (GAS-X) 125 mg capsule, Take 125 mg by mouth four (4) times daily as needed for Flatulence. rizatriptan (MAXALT) 10 mg tablet, Take 1 Tablet by mouth every eight (8) hours as needed for Migraine. May repeat in 2 hours if needed  topiramate ER (Trokendi XR) 100 mg capsule, Take 1 Capsule by mouth daily. Indications: migraine prevention  ibuprofen (MOTRIN) 800 mg tablet, Take 1 Tab by mouth every six (6) hours as needed for Pain.  propranolol LA (INDERAL LA) 60 mg SR capsule, Take 60 mg by mouth daily. simvastatin (ZOCOR) 10 mg tablet, 10 mg nightly. cholecalciferol, vitamin D3, (VITAMIN D3) 2,000 unit tab, Take  by mouth daily. No current facility-administered medications for this visit. Allergies:  -- Contrast Dye (Iodine) -- Hives   -- Lyrica (Pregabalin) -- Other (comments)    --  Behavior change  _____________________________________________________________________________    Abdominal Pain  The history is provided by the patient. This is a new problem. The current episode started more than 1 week ago. The problem occurs daily. The problem has been gradually worsening. Associated symptoms include chest pain and abdominal pain. Pertinent negatives include no headaches and no shortness of breath. The treatment provided no relief.        Review of Systems   Constitutional: Negative for chills, fever and weight loss. HENT: Negative for ear pain. Eyes: Negative for pain. Respiratory: Negative for shortness of breath. Cardiovascular: Positive for chest pain. Gastrointestinal: Positive for abdominal pain. Negative for blood in stool. Genitourinary: Negative for hematuria. Musculoskeletal: Negative for joint pain. Skin: Negative for rash. Neurological: Negative for dizziness, focal weakness, seizures and headaches. Endo/Heme/Allergies: Does not bruise/bleed easily. Psychiatric/Behavioral: The patient does not have insomnia. Physical Exam  Constitutional:       General: He is not in acute distress. Appearance: He is well-developed. HENT:      Head: Normocephalic. Mouth/Throat:      Pharynx: No oropharyngeal exudate. Eyes:      General: No scleral icterus. Pupils: Pupils are equal, round, and reactive to light. Neck:      Trachea: No tracheal deviation. Cardiovascular:      Rate and Rhythm: Normal rate and regular rhythm. Heart sounds: Normal heart sounds. Pulmonary:      Effort: Pulmonary effort is normal.      Breath sounds: Normal breath sounds. No wheezing. Abdominal:      General: There is no distension. Palpations: Abdomen is soft. There is no mass. Tenderness: There is no abdominal tenderness. Hernia: No hernia is present. Musculoskeletal:         General: Normal range of motion. Cervical back: Neck supple. Lymphadenopathy:      Cervical: No cervical adenopathy. Skin:     General: Skin is warm. Findings: No erythema or rash. Neurological:      Mental Status: He is alert and oriented to person, place, and time. Psychiatric:         Behavior: Behavior normal.         ASSESSMENT and PLAN    ICD-10-CM ICD-9-CM    1. Gallstones  K80.20 574.20      I reviewed findings with Christina Mcclellan. I think he has 2 separate issues. The right upper quadrant attacks that he has think are related to the gallbladder.   I do not think the gallbladder is related to the dyspepsia/chst pain symptoms. Motility is mildly abnormal on barium swallow and an EGD is pending. I think it makes sense to do the EGD first to rule out any additional pathology. Ultimately he will need a cholecystectomy. I Recommend we proceed with a Laparoscopic Cholecystectomy with Intraoperative Cholangiogram.    Risks, Benefits, and Alternatives of the procedure were discussed with Tato Tapia including:  the risk of the anesthesia, bleeding, infection, injury to the intestines, injury to the ducts, conversion to an open procedure, and the lack of symptomatic improvement. The patient is agreeable to proceed. We will schedule surgery after his EGD. Thank you for this consult.

## 2022-05-04 ENCOUNTER — HOSPITAL ENCOUNTER (OUTPATIENT)
Dept: CT IMAGING | Age: 52
Discharge: HOME | End: 2022-05-04
Attending: INTERNAL MEDICINE
Payer: SELF-PAY

## 2022-05-04 DIAGNOSIS — E78.00 HYPERCHOLESTEREMIA: ICD-10-CM

## 2022-05-04 DIAGNOSIS — R07.2 PRECORDIAL CHEST PAIN: ICD-10-CM

## 2022-05-04 PROCEDURE — 75571 CT HRT W/O DYE W/CA TEST: CPT

## 2022-05-04 NOTE — PROGRESS NOTES
Coronary calcium score of 0-high negative predictive value.   It appears his coronaries are likely normal with no plaque at all

## 2022-05-05 ENCOUNTER — TELEPHONE (OUTPATIENT)
Dept: CARDIOLOGY CLINIC | Age: 52
End: 2022-05-05

## 2022-05-05 NOTE — TELEPHONE ENCOUNTER
----- Message from Ede Lund NP sent at 5/4/2022  3:28 PM EDT -----  Coronary calcium score of 0-high negative predictive value.   It appears his coronaries are likely normal with no plaque at all

## 2022-06-07 ENCOUNTER — TELEPHONE (OUTPATIENT)
Dept: CARDIOLOGY CLINIC | Age: 52
End: 2022-06-07

## 2022-06-07 ENCOUNTER — OFFICE VISIT (OUTPATIENT)
Dept: CARDIOLOGY CLINIC | Age: 52
End: 2022-06-07
Payer: COMMERCIAL

## 2022-06-07 VITALS
HEIGHT: 70 IN | BODY MASS INDEX: 27.63 KG/M2 | RESPIRATION RATE: 16 BRPM | OXYGEN SATURATION: 100 % | DIASTOLIC BLOOD PRESSURE: 60 MMHG | WEIGHT: 193 LBS | SYSTOLIC BLOOD PRESSURE: 100 MMHG | HEART RATE: 74 BPM

## 2022-06-07 DIAGNOSIS — I47.1 PSVT (PAROXYSMAL SUPRAVENTRICULAR TACHYCARDIA) (HCC): Primary | ICD-10-CM

## 2022-06-07 DIAGNOSIS — K21.9 GASTROESOPHAGEAL REFLUX DISEASE, UNSPECIFIED WHETHER ESOPHAGITIS PRESENT: ICD-10-CM

## 2022-06-07 DIAGNOSIS — E78.2 MIXED HYPERLIPIDEMIA: ICD-10-CM

## 2022-06-07 DIAGNOSIS — R93.1 AGATSTON CAC SCORE, <100: ICD-10-CM

## 2022-06-07 DIAGNOSIS — R07.2 PRECORDIAL CHEST PAIN: ICD-10-CM

## 2022-06-07 PROCEDURE — 99214 OFFICE O/P EST MOD 30 MIN: CPT | Performed by: INTERNAL MEDICINE

## 2022-06-07 RX ORDER — METOPROLOL SUCCINATE 50 MG/1
50 TABLET, EXTENDED RELEASE ORAL DAILY
Qty: 90 TABLET | Refills: 1 | Status: SHIPPED | OUTPATIENT
Start: 2022-06-07

## 2022-06-07 NOTE — PROGRESS NOTES
[facility] [address] [place] [address]  [phone]     Subjective:      Viral Hill is a 46 y.o. male is here for routine f/u. The patient was last seen by us in April 2022. At that time, the patient presented for new patient visit, following up from an ER visit for chest discomfort in February. After that, he had a stress echo that was negative for ischemia, but with SVT at a low level of exercise, while off propranolol. He was recommended to start on his propranolol again. Subsequently he had a coronary calcium score of 0. Please see prior results below under cardiac testing. Today, he has breakthrough chest tightness with walking about 3 times a week. The patient denies  shortness of breath, orthopnea, PND, LE edema, palpitations, syncope, or presyncope.        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    Cerebral microvascular disease 01/07/2020    Migraine 2009      Pastor Rm MD  Past Medical History:   Diagnosis Date    Back pain     Hypercholesteremia     Migraine 2009    Neck pain       Past Surgical History:   Procedure Laterality Date    HX APPENDECTOMY  1990    HX CERVICAL LAMINECTOMY      HX CERVICAL LAMINECTOMY  2016    HX ORTHOPAEDIC      Neck and back surgeries: 2008, 2014, 2016    HX SHOULDER ARTHROSCOPY  2021    HX TUMOR REMOVAL  2003    Fatty tumors    HX VASECTOMY       Allergies   Allergen Reactions    Contrast Dye [Iodine] Hives    Lyrica [Pregabalin] Other (comments)     Behavior change      Family History   Problem Relation Age of Onset    Cancer Mother         uterine    Other Father         ALS    Stroke Maternal Grandfather     Cancer Maternal Grandfather     Dementia Paternal Grandmother     Dementia Paternal Grandfather     Cancer Maternal Grandmother         breast      Social History     Socioeconomic History    Marital status:      Spouse name: Not on file    Number of children: Not on file    Years of education: Not on file    Highest education level: Not on file   Occupational History    Not on file   Tobacco Use    Smoking status: Never Smoker    Smokeless tobacco: Never Used   Vaping Use    Vaping Use: Never used   Substance and Sexual Activity    Alcohol use: Not Currently    Drug use: Never    Sexual activity: Yes     Partners: Female   Other Topics Concern    Not on file   Social History Narrative    Not on file     Social Determinants of Health     Financial Resource Strain:     Difficulty of Paying Living Expenses: Not on file   Food Insecurity:     Worried About Running Out of Food in the Last Year: Not on file    Michael of Food in the Last Year: Not on file   Transportation Needs:     Lack of Transportation (Medical): Not on file    Lack of Transportation (Non-Medical):  Not on file   Physical Activity:     Days of Exercise per Week: Not on file    Minutes of Exercise per Session: Not on file   Stress:     Feeling of Stress : Not on file   Social Connections:     Frequency of Communication with Friends and Family: Not on file    Frequency of Social Gatherings with Friends and Family: Not on file    Attends Hindu Services: Not on file    Active Member of [address] [place] [address] [employer] or Organizations: Not on file    Attends Club or Organization Meetings: Not on file    Marital Status: Not on file   Intimate Partner Violence:     Fear of Current or Ex-Partner: Not on file    Emotionally Abused: Not on file    Physically Abused: Not on file    Sexually Abused: Not on file   Housing Stability:     Unable to Pay for Housing in the Last Year: Not on file    Number of [place] in the Last Year: Not on file    Unstable Housing in the Last Year: Not on file      Current Outpatient Medications   Medication Sig    famotidine (PEPCID PO) Take  by mouth.  calcium carb/magnesium hydrox (MYLANTA PO) Take  by mouth.  omeprazole (PRILOSEC) 20 mg capsule Take 20 mg by mouth daily.  Emgality Pen 120 mg/mL injection INJECT 1 MILLILITER SUBCUTANEOUSLY EVERY 30 DAYS    rizatriptan (MAXALT) 10 mg tablet Take 1 Tablet by mouth every eight (8) hours as needed for Migraine. May repeat in 2 hours if needed    ibuprofen (MOTRIN) 800 mg tablet Take 1 Tab by mouth every six (6) hours as needed for Pain.  propranolol LA (INDERAL LA) 60 mg SR capsule Take 60 mg by mouth daily.  simvastatin (ZOCOR) 10 mg tablet 10 mg nightly.  cholecalciferol, vitamin D3, (VITAMIN D3) 2,000 unit tab Take  by mouth daily.  calcium carbonate (TUMS PO) Take  by mouth nightly as needed. (Patient not taking: Reported on 6/7/2022)    simethicone (GAS-X) 125 mg capsule Take 125 mg by mouth four (4) times daily as needed for Flatulence. (Patient not taking: Reported on 6/7/2022)    topiramate ER (Trokendi XR) 100 mg capsule Take 1 Capsule by mouth daily. Indications: migraine prevention (Patient not taking: Reported on 6/7/2022)     No current facility-administered medications for this visit. Review of Symptoms:  11 systems reviewed, negative other than as stated in the HPI    Physical ExamPhysical Exam:    Vitals:    06/07/22 0908   BP: 100/60   Pulse: 74   Resp: 16   SpO2: 100%   Weight: 193 lb (87.5 kg)   Height: 5' 10\" (1.778 m)     Body mass index is 27.69 kg/m². General PE  Gen:  NAD  Mental Status - Alert. General Appearance - Not in acute distress. HEENT:  PERRL, no carotid bruits or JVD  Chest and Lung Exam   Inspection: Accessory muscles - No use of accessory muscles in breathing. Auscultation:   Breath sounds: - Normal.   Cardiovascular   Inspection: Jugular vein - Bilateral - Inspection Normal.   Palpation/Percussion:   Apical Impulse: - Normal.   Auscultation: Rhythm - Regular. Heart Sounds - S1 WNL and S2 WNL.  No S3 or S4.   Murmurs & Other Heart Sounds: Auscultation of the heart reveals - No Murmurs. Peripheral Vascular   Upper Extremity: Inspection - Bilateral - No Cyanotic nailbeds or Digital clubbing. Lower Extremity:   Palpation: Edema - Bilateral - No edema. Abdomen:   Soft, non-tender, bowel sounds are active. Neuro: A&O times 3, CN and motor grossly WNL    Labs:   No results found for: CHOL, CHOLX, CHLST, CHOLV, 814680, HDL, HDLP, LDL, LDLC, DLDLP, TGLX, TRIGL, TRIGP, CHHD, CHHDX  No results found for: CPK, CPKX, CPX  Lab Results   Component Value Date/Time    Sodium 138 02/07/2022 03:50 PM    Potassium 4.9 02/07/2022 03:50 PM    Chloride 105 02/07/2022 03:50 PM    CO2 29 02/07/2022 03:50 PM    Anion gap 4 (L) 02/07/2022 03:50 PM    Glucose 98 02/07/2022 03:50 PM    BUN 16 02/07/2022 03:50 PM    Creatinine 1.04 02/07/2022 03:50 PM    BUN/Creatinine ratio 15 02/07/2022 03:50 PM    GFR est AA >60 02/07/2022 03:50 PM    GFR est non-AA >60 02/07/2022 03:50 PM    Calcium 9.2 02/07/2022 03:50 PM    Bilirubin, total 0.4 02/07/2022 03:50 PM    Alk. phosphatase 64 02/07/2022 03:50 PM    Protein, total 7.3 02/07/2022 03:50 PM    Albumin 3.8 02/07/2022 03:50 PM    Globulin 3.5 02/07/2022 03:50 PM    A-G Ratio 1.1 02/07/2022 03:50 PM    ALT (SGPT) 35 02/07/2022 03:50 PM       04/21/22    ECHO STRESS 04/21/2022 4/25/2022    Interpretation Summary    Freddrick Lundborg: A Jose protocol stress test was performed. The patient exercised for 3 min and 1 sec.   Stress ECG: Arrhythmias during stress: SVT at up to 190 BPM, starting 45 seconds into stage 1. Resolved spontaneously.   ECG: Stress ECG was negative for ischemia.   Left Ventricle: Normal left ventricular systolic function. Left ventricle size is normal. Normal wall motion.   Post-stress Echo: The post-stress echo showed normal wall motion which was improved compared to baseline.     Study Impression: The study is negative for echocardiographic evidence of ischemia, but induced rapid SVT at a low-level of exercise. Signed by: Elizabeth Dan MD on 4/21/2022  2:30 PM    CT Results (most recent):  Results from [place] encounter on 05/04/22    CT HEART W/O CONT WITH CALCIUM    Narrative  EXAM: CT HEART W/O CONT WITH CALCIUM    INDICATION: Cardiovascular risk. Pure hypercholesterolemia, unspecified    COMPARISON: None. TECHNIQUE: Unenhanced multislice helical CT of the heart was performed on a  64-slice multiple detector row CT system ([employer]). Quantitative coronary artery  CT calcium scoring was performed using [employer] software. No intravenous contrast was  administered. CT dose reduction was achieved through use of a standardized  protocol tailored for this examination and automatic exposure control for dose  modulation. FINDINGS:  The coronary calcium in each vessel is as follows:    Left main coronary artery: 0  Left anterior descending coronary artery: 0  Left circumflex coronary artery: 0  Right coronary artery: 0  Posterior descending coronary artery: 0    Total calcium score: 0    Calcium score interpretation:  0-0 = No evidence of CAD  1-10 = Minimal evidence of CAD   = Mild evidence of CAD  101-400 = Moderate evidence of CAD  >400 = Extensive evidence of CAD    A score of 0 is at the 0 percentile rank. Therefore, 100% of the males aged  46-50 will have a higher calcium score. Chest CT findings:  No significant abnormality in the incidentally imaged lower  lungs. 2 mm right middle lobe lung nodule (301:37). The entire lung fields are  not imaged on this examination. No evidence of mass or lymphadenopathy. The  incidentally imaged portions of the upper abdominal organs are within normal  limits on this unenhanced examination. The bones are within normal limits. Impression  Total calcium score of 0. No evidence of calcified plaque. This is a \"negative\"  examination. There is a greater than 97 % chance for absence of coronary artery  disease. The result should be discussed with the patient taking into account  other risk factors such as age, gender, family history, diabetes, smoking or  high cholesterol levels. Assessment:          ICD-10-CM ICD-9-CM    1. PSVT (paroxysmal supraventricular tachycardia) (HCC)  I47.1 427.0    2. Precordial chest pain  R07.2 786.51    3. Agatston CAC score, <100  R93.1 793.2    4. Mixed hyperlipidemia  E78.2 272.2    5. Gastroesophageal reflux disease, unspecified whether esophagitis present  K21.9 530.81        Orders Placed This Encounter    famotidine (PEPCID PO)     Sig: Take  by mouth.  calcium carb/magnesium hydrox (MYLANTA PO)     Sig: Take  by mouth. Plan:     Precordial chest pain, with stress echo revealing SVT at a low level of exercise while off of propranolol:  -Currently back on propranolol, and having breakthrough about 3 times a week-check 2-week event monitor for correlation with SVT  -Change propranolol to metoprolol XL after the first week of the event monitor to compare efficacy of the 2 beta-blockers  -High suspicion that he will need SVT ablation because his blood pressure is too low to facilitate a lot of medication and he still having breakthrough- we will reserve an appointment with Dr. Donovan Hammond  -Coronary calcium score of 0 is comforting, with a very high negative predictive value. Discussed with patient.  -Stress echo was normal, but check full echo to rule out valvular pathology  -Advised patient to have magnesium and TSH checked with PCP at upcoming appointment    Mixed hyperlipidemia:  Lipids followed by PCP. On low-dose simvastatin. Relatively low priority to treat aggressively with coronary calcium score of 0. GERD:  Currently on Pepcid, and symptoms persistent, having GI work-up soon. Follow up in 3 months with me with medication adjustments in the meantime, and with Dr. Donovan Hammond next available.     Lou Roca MD

## 2022-06-07 NOTE — PATIENT INSTRUCTIONS
Your physician has ordered a 2 week Loop Heart Monitor. Monitor will be mailed to your house within 5-7 days. You will be scheduled for an Echocardiogram.  Please check your Thyroid and Mag level with your primary physician. You also were referred to Dr. Jarrett Apley due to your SVT.    Come back in 3 months for your follow up with Dr. Marisol River

## 2022-06-07 NOTE — LETTER
6/7/2022    Patient: Tato Tapia   YOB: 1970   Date of Visit: 6/7/2022     Abbie Aguilar MD  [address] [place] [address] [address] [address]  Via Fax: [phone]    Dear Abbie Aguilar MD,      Thank you for referring Mr. Namon Klinefelter to [address] [facility] for evaluation. My notes for this consultation are attached. If you have questions, please do not hesitate to call me. I look forward to following your patient along with you.       Sincerely,    Emely Farrell MD

## 2022-06-07 NOTE — TELEPHONE ENCOUNTER
----- Message from [place] sent at 6/7/2022 10:03 AM EDT -----  Dr. Betty Sheldon has ordered a 2 week Loop monitor for SVT.  Thanks ladies

## 2022-07-02 ENCOUNTER — TELEPHONE (OUTPATIENT)
Dept: CARDIOLOGY CLINIC | Age: 52
End: 2022-07-02

## 2022-07-02 NOTE — TELEPHONE ENCOUNTER
Please advise event monitor was normal with no evidence of supraventricular tachycardia or other arrhythmias. Rare early heartbeats which we all have. If doing well, can follow-up in 6 months.

## 2022-07-08 ENCOUNTER — ANCILLARY PROCEDURE (OUTPATIENT)
Dept: CARDIOLOGY CLINIC | Age: 52
End: 2022-07-08
Payer: COMMERCIAL

## 2022-07-08 VITALS
DIASTOLIC BLOOD PRESSURE: 60 MMHG | WEIGHT: 193 LBS | BODY MASS INDEX: 27.63 KG/M2 | SYSTOLIC BLOOD PRESSURE: 100 MMHG | HEIGHT: 70 IN

## 2022-07-08 DIAGNOSIS — I47.1 PSVT (PAROXYSMAL SUPRAVENTRICULAR TACHYCARDIA) (HCC): ICD-10-CM

## 2022-07-08 PROCEDURE — 93306 TTE W/DOPPLER COMPLETE: CPT | Performed by: INTERNAL MEDICINE

## 2022-07-09 LAB
ECHO AO ASC DIAM: 3.2 CM
ECHO AO ASCENDING AORTA INDEX: 1.55 CM/M2
ECHO AO ROOT DIAM: 3.4 CM
ECHO AO ROOT INDEX: 1.65 CM/M2
ECHO AV PEAK GRADIENT: 4 MMHG
ECHO AV PEAK VELOCITY: 1 M/S
ECHO AV VELOCITY RATIO: 1.1
ECHO EST RA PRESSURE: 3 MMHG
ECHO LA DIAMETER INDEX: 1.55 CM/M2
ECHO LA DIAMETER: 3.2 CM
ECHO LA TO AORTIC ROOT RATIO: 0.94
ECHO LA VOL 2C: 39 ML (ref 18–58)
ECHO LA VOL 4C: 39 ML (ref 18–58)
ECHO LA VOL BP: 42 ML (ref 18–58)
ECHO LA VOL/BSA BIPLANE: 20 ML/M2 (ref 16–34)
ECHO LA VOLUME AREA LENGTH: 44 ML
ECHO LA VOLUME INDEX A2C: 19 ML/M2 (ref 16–34)
ECHO LA VOLUME INDEX A4C: 19 ML/M2 (ref 16–34)
ECHO LA VOLUME INDEX AREA LENGTH: 21 ML/M2 (ref 16–34)
ECHO LV E' LATERAL VELOCITY: 11 CM/S
ECHO LV E' SEPTAL VELOCITY: 7 CM/S
ECHO LV FRACTIONAL SHORTENING: 52 % (ref 28–44)
ECHO LV INTERNAL DIMENSION DIASTOLE INDEX: 2.04 CM/M2
ECHO LV INTERNAL DIMENSION DIASTOLIC: 4.2 CM (ref 4.2–5.9)
ECHO LV INTERNAL DIMENSION SYSTOLIC INDEX: 0.97 CM/M2
ECHO LV INTERNAL DIMENSION SYSTOLIC: 2 CM
ECHO LV ISOVOLUMETRIC RELAXATION TIME (IVRT): 76.1 MS
ECHO LV IVSD: 0.9 CM (ref 0.6–1)
ECHO LV MASS 2D: 118.7 G (ref 88–224)
ECHO LV MASS INDEX 2D: 57.6 G/M2 (ref 49–115)
ECHO LV POSTERIOR WALL DIASTOLIC: 0.9 CM (ref 0.6–1)
ECHO LV RELATIVE WALL THICKNESS RATIO: 0.43
ECHO LVOT PEAK GRADIENT: 5 MMHG
ECHO LVOT PEAK VELOCITY: 1.1 M/S
ECHO MV "A" WAVE DURATION: 123.7 MSEC
ECHO MV A VELOCITY: 0.66 M/S
ECHO MV E DECELERATION TIME (DT): 180.8 MS
ECHO MV E VELOCITY: 0.86 M/S
ECHO MV E/A RATIO: 1.3
ECHO MV E/E' LATERAL: 7.82
ECHO MV E/E' RATIO (AVERAGED): 10.05
ECHO MV E/E' SEPTAL: 12.29
ECHO RIGHT VENTRICULAR SYSTOLIC PRESSURE (RVSP): 26 MMHG
ECHO RV FREE WALL PEAK S': 13 CM/S
ECHO RV TAPSE: 2.7 CM (ref 1.7–?)
ECHO TV REGURGITANT MAX VELOCITY: 2.39 M/S
ECHO TV REGURGITANT PEAK GRADIENT: 23 MMHG

## 2022-07-11 RX ORDER — BISMUTH SUBSALICYLATE 262 MG
1 TABLET,CHEWABLE ORAL DAILY
COMMUNITY

## 2022-07-11 NOTE — PERIOP NOTES
[facility]  Preoperative Instructions    Surgery Date 7/15/2022          Time of Arrival to be called  Contact # [phone]    1. On the day of your surgery, please report to the Surgical Services Registration Desk and sign in at your designated time. The Surgery Center is located to the right of the Emergency Room. 2. You must have someone with you to drive you home. You should not drive a car for 24 hours following surgery. Please make arrangements for a friend or family member to stay with you for the first 24 hours after your surgery. 3. Do not have anything to eat or drink (including water, gum, mints, coffee, juice) after midnight 7/14/2022 . ? This may not apply to medications prescribed by your physician. ?(Please note below the special instructions with medications to take the morning of your procedure.)    4. We recommend you do not drink any alcoholic beverages for 24 hours before and after your surgery. 5. Contact your surgeons office for instructions on the following medications: non-steroidal anti-inflammatory drugs (i.e. Advil, Aleve), vitamins, and supplements. (Some surgeons will want you to stop these medications prior to surgery and others may allow you to take them)  **If you are currently taking Plavix, Coumadin, Aspirin and/or other blood-thinning agents, contact your surgeon for instructions. ** Your surgeon will partner with the physician prescribing these medications to determine if it is safe to stop or if you need to continue taking. Please do not stop taking these medications without instructions from your surgeon    6. Wear comfortable clothes. Wear glasses instead of contacts. Do not bring any money or jewelry. Please bring picture ID, insurance card, and any prearranged co-payment or hospital payment. Do not wear make-up, particularly mascara the morning of your surgery. Do not wear nail polish, particularly if you are having foot /hand surgery. Wear your hair loose or down, no ponytails, buns, jacqueline pins or clips. All body piercings must be removed. Please shower with antibacterial soap for three consecutive days before and on the morning of surgery, but do not apply any lotions, powders or deodorants after the shower on the day of surgery. Please use a fresh towels after each shower. Please sleep in clean clothes and change bed linens the night before surgery. Please do not shave for 48 hours prior to surgery. Shaving of the face is acceptable. 7. You should understand that if you do not follow these instructions your surgery may be cancelled. If your physical condition changes (I.e. fever, cold or flu) please contact your surgeon as soon as possible. 8. It is important that you be on time. If a situation occurs where you may be late, please call [phone] (OR Holding Area). 9. If you have any questions and or problems, please call [phone] (Pre-admission Testing). 10. Your surgery time may be subject to change. You will receive a phone call the evening prior if your time changes. 11.  If having outpatient surgery, you must have someone to drive you here, stay with you during the duration of your stay, and to drive you home at time of discharge. 12.  Due to current COVID restrictions, only ONE adult may accompany you the day of the procedure. We have limited seating available. If our waiting room is at capacity, your ride may be asked to remain in their vehicle. No children are allowed in the waiting room. Bring your covid vaccine card on day surgery. Special Instructions:     TAKE ALL MEDICATIONS DAY OF SURGERY EXCEPT: Vitamins/supplements      I understand a pre-operative phone call will be made to verify my surgery time. In the event that I am not available, I give permission for a message to be left on my answering service and/or with another person?   yes         ___________________      __________   7/11/2022 @ 1540    (Signature of Patient)             (Witness)                (Date and Time)

## 2022-07-12 ENCOUNTER — TELEPHONE (OUTPATIENT)
Dept: CARDIOLOGY CLINIC | Age: 52
End: 2022-07-12

## 2022-07-12 NOTE — TELEPHONE ENCOUNTER
----- Message from Elva Guzman NP sent at 7/11/2022  9:16 AM EDT -----  Echo showed normal pumping heart strength, valves look great and healthy

## 2022-07-14 ENCOUNTER — ANESTHESIA EVENT (OUTPATIENT)
Dept: SURGERY | Age: 52
End: 2022-07-14
Payer: COMMERCIAL

## 2022-07-15 ENCOUNTER — ANESTHESIA (OUTPATIENT)
Dept: SURGERY | Age: 52
End: 2022-07-15
Payer: COMMERCIAL

## 2022-07-15 ENCOUNTER — HOSPITAL ENCOUNTER (OUTPATIENT)
Age: 52
Setting detail: OUTPATIENT SURGERY
Discharge: HOME | End: 2022-07-15
Attending: SURGERY | Admitting: SURGERY
Payer: COMMERCIAL

## 2022-07-15 ENCOUNTER — APPOINTMENT (OUTPATIENT)
Dept: GENERAL RADIOLOGY | Age: 52
End: 2022-07-15
Attending: SURGERY
Payer: COMMERCIAL

## 2022-07-15 VITALS
HEART RATE: 74 BPM | HEIGHT: 70 IN | BODY MASS INDEX: 27.3 KG/M2 | WEIGHT: 190.7 LBS | DIASTOLIC BLOOD PRESSURE: 67 MMHG | SYSTOLIC BLOOD PRESSURE: 105 MMHG | TEMPERATURE: 97.5 F | OXYGEN SATURATION: 98 % | RESPIRATION RATE: 13 BRPM

## 2022-07-15 DIAGNOSIS — R52 PAIN: Primary | ICD-10-CM

## 2022-07-15 LAB
ALBUMIN SERPL-MCNC: 3.7 G/DL (ref 3.5–5)
ALBUMIN/GLOB SERPL: 0.9 {RATIO} (ref 1.1–2.2)
ALP SERPL-CCNC: 69 U/L (ref 45–117)
ALT SERPL-CCNC: 29 U/L (ref 12–78)
ANION GAP SERPL CALC-SCNC: 5 MMOL/L (ref 5–15)
AST SERPL-CCNC: 19 U/L (ref 15–37)
BILIRUB SERPL-MCNC: 0.8 MG/DL (ref 0.2–1)
BUN SERPL-MCNC: 15 MG/DL (ref 6–20)
BUN/CREAT SERPL: 13 (ref 12–20)
CALCIUM SERPL-MCNC: 9.2 MG/DL (ref 8.5–10.1)
CHLORIDE SERPL-SCNC: 109 MMOL/L (ref 97–108)
CO2 SERPL-SCNC: 28 MMOL/L (ref 21–32)
CREAT SERPL-MCNC: 1.12 MG/DL (ref 0.7–1.3)
ERYTHROCYTE [DISTWIDTH] IN BLOOD BY AUTOMATED COUNT: 13 % (ref 11.5–14.5)
GLOBULIN SER CALC-MCNC: 3.9 G/DL (ref 2–4)
GLUCOSE SERPL-MCNC: 96 MG/DL (ref 65–100)
HCT VFR BLD AUTO: 45.4 % (ref 36.6–50.3)
HGB BLD-MCNC: 14.7 G/DL (ref 12.1–17)
LIPASE SERPL-CCNC: 187 U/L (ref 73–393)
MCH RBC QN AUTO: 30.2 PG (ref 26–34)
MCHC RBC AUTO-ENTMCNC: 32.4 G/DL (ref 30–36.5)
MCV RBC AUTO: 93.2 FL (ref 80–99)
NRBC # BLD: 0 K/UL (ref 0–0.01)
NRBC BLD-RTO: 0 PER 100 WBC
PLATELET # BLD AUTO: 223 K/UL (ref 150–400)
PMV BLD AUTO: 9.9 FL (ref 8.9–12.9)
POTASSIUM SERPL-SCNC: 4.1 MMOL/L (ref 3.5–5.1)
PROT SERPL-MCNC: 7.6 G/DL (ref 6.4–8.2)
RBC # BLD AUTO: 4.87 M/UL (ref 4.1–5.7)
SODIUM SERPL-SCNC: 142 MMOL/L (ref 136–145)
WBC # BLD AUTO: 6.9 K/UL (ref 4.1–11.1)

## 2022-07-15 PROCEDURE — 77030018875 HC APPL CLP LIG4 J&J -B: Performed by: SURGERY

## 2022-07-15 PROCEDURE — 74011250636 HC RX REV CODE- 250/636: Performed by: ANESTHESIOLOGY

## 2022-07-15 PROCEDURE — 77030008606 HC TRCR ENDOSC KII AMR -B: Performed by: SURGERY

## 2022-07-15 PROCEDURE — 83690 ASSAY OF LIPASE: CPT

## 2022-07-15 PROCEDURE — 77030008603 HC TRCR ENDOSC EPATH J&J -C: Performed by: SURGERY

## 2022-07-15 PROCEDURE — 74011250636 HC RX REV CODE- 250/636: Performed by: NURSE ANESTHETIST, CERTIFIED REGISTERED

## 2022-07-15 PROCEDURE — 77030002933 HC SUT MCRYL J&J -A: Performed by: SURGERY

## 2022-07-15 PROCEDURE — 74300 X-RAY BILE DUCTS/PANCREAS: CPT

## 2022-07-15 PROCEDURE — C1758 CATHETER, URETERAL: HCPCS | Performed by: SURGERY

## 2022-07-15 PROCEDURE — 76210000020 HC REC RM PH II FIRST 0.5 HR: Performed by: SURGERY

## 2022-07-15 PROCEDURE — 36415 COLL VENOUS BLD VENIPUNCTURE: CPT

## 2022-07-15 PROCEDURE — 74011250637 HC RX REV CODE- 250/637: Performed by: ANESTHESIOLOGY

## 2022-07-15 PROCEDURE — 2709999900 HC NON-CHARGEABLE SUPPLY: Performed by: SURGERY

## 2022-07-15 PROCEDURE — 85027 COMPLETE CBC AUTOMATED: CPT

## 2022-07-15 PROCEDURE — 74011000250 HC RX REV CODE- 250: Performed by: NURSE ANESTHETIST, CERTIFIED REGISTERED

## 2022-07-15 PROCEDURE — 74011000636 HC RX REV CODE- 636: Performed by: SURGERY

## 2022-07-15 PROCEDURE — 76210000017 HC OR PH I REC 1.5 TO 2 HR: Performed by: SURGERY

## 2022-07-15 PROCEDURE — 77030018684: Performed by: SURGERY

## 2022-07-15 PROCEDURE — 76010000149 HC OR TIME 1 TO 1.5 HR: Performed by: SURGERY

## 2022-07-15 PROCEDURE — 74011250637 HC RX REV CODE- 250/637: Performed by: SURGERY

## 2022-07-15 PROCEDURE — 74300 X-RAY BILE DUCTS/PANCREAS: CPT | Performed by: SURGERY

## 2022-07-15 PROCEDURE — 74011000250 HC RX REV CODE- 250: Performed by: SURGERY

## 2022-07-15 PROCEDURE — 76060000033 HC ANESTHESIA 1 TO 1.5 HR: Performed by: SURGERY

## 2022-07-15 PROCEDURE — 74011250636 HC RX REV CODE- 250/636: Performed by: SURGERY

## 2022-07-15 PROCEDURE — 88304 TISSUE EXAM BY PATHOLOGIST: CPT

## 2022-07-15 PROCEDURE — 77030016151 HC PROTCTR LNS DFOG COVD -B: Performed by: SURGERY

## 2022-07-15 PROCEDURE — 77030019908 HC STETH ESOPH SIMS -A: Performed by: ANESTHESIOLOGY

## 2022-07-15 PROCEDURE — 77030008684 HC TU ET CUF COVD -B: Performed by: ANESTHESIOLOGY

## 2022-07-15 PROCEDURE — 77030008771 HC TU NG SALEM SUMP -A: Performed by: ANESTHESIOLOGY

## 2022-07-15 PROCEDURE — 47563 LAPARO CHOLECYSTECTOMY/GRAPH: CPT | Performed by: SURGERY

## 2022-07-15 PROCEDURE — 77030026438 HC STYL ET INTUB CARD -A: Performed by: ANESTHESIOLOGY

## 2022-07-15 PROCEDURE — 80053 COMPREHEN METABOLIC PANEL: CPT

## 2022-07-15 PROCEDURE — 77030008756 HC TU IRR SUC STRY -B: Performed by: SURGERY

## 2022-07-15 RX ORDER — KETOROLAC TROMETHAMINE 30 MG/ML
INJECTION, SOLUTION INTRAMUSCULAR; INTRAVENOUS AS NEEDED
Status: DISCONTINUED | OUTPATIENT
Start: 2022-07-15 | End: 2022-07-15 | Stop reason: HOSPADM

## 2022-07-15 RX ORDER — SCOLOPAMINE TRANSDERMAL SYSTEM 1 MG/1
1 PATCH, EXTENDED RELEASE TRANSDERMAL
Status: DISCONTINUED | OUTPATIENT
Start: 2022-07-15 | End: 2022-07-18 | Stop reason: HOSPADM

## 2022-07-15 RX ORDER — SODIUM CHLORIDE 0.9 % (FLUSH) 0.9 %
5-40 SYRINGE (ML) INJECTION AS NEEDED
Status: DISCONTINUED | OUTPATIENT
Start: 2022-07-15 | End: 2022-07-15 | Stop reason: HOSPADM

## 2022-07-15 RX ORDER — BUPIVACAINE HYDROCHLORIDE AND EPINEPHRINE 5; 5 MG/ML; UG/ML
INJECTION, SOLUTION EPIDURAL; INTRACAUDAL; PERINEURAL AS NEEDED
Status: DISCONTINUED | OUTPATIENT
Start: 2022-07-15 | End: 2022-07-15 | Stop reason: HOSPADM

## 2022-07-15 RX ORDER — DEXAMETHASONE SODIUM PHOSPHATE 4 MG/ML
INJECTION, SOLUTION INTRA-ARTICULAR; INTRALESIONAL; INTRAMUSCULAR; INTRAVENOUS; SOFT TISSUE AS NEEDED
Status: DISCONTINUED | OUTPATIENT
Start: 2022-07-15 | End: 2022-07-15 | Stop reason: HOSPADM

## 2022-07-15 RX ORDER — SODIUM CHLORIDE, SODIUM LACTATE, POTASSIUM CHLORIDE, CALCIUM CHLORIDE 600; 310; 30; 20 MG/100ML; MG/100ML; MG/100ML; MG/100ML
25 INJECTION, SOLUTION INTRAVENOUS CONTINUOUS
Status: DISCONTINUED | OUTPATIENT
Start: 2022-07-15 | End: 2022-07-15 | Stop reason: HOSPADM

## 2022-07-15 RX ORDER — ONDANSETRON 2 MG/ML
INJECTION INTRAMUSCULAR; INTRAVENOUS AS NEEDED
Status: DISCONTINUED | OUTPATIENT
Start: 2022-07-15 | End: 2022-07-15 | Stop reason: HOSPADM

## 2022-07-15 RX ORDER — ONDANSETRON 4 MG/1
4 TABLET, ORALLY DISINTEGRATING ORAL
Qty: 8 TABLET | Refills: 0 | Status: SHIPPED | OUTPATIENT
Start: 2022-07-15 | End: 2022-07-28 | Stop reason: ALTCHOICE

## 2022-07-15 RX ORDER — ACETAMINOPHEN 500 MG
1000 TABLET ORAL 4 TIMES DAILY
Status: SHIPPED | COMMUNITY
Start: 2022-07-15

## 2022-07-15 RX ORDER — FENTANYL CITRATE 50 UG/ML
INJECTION, SOLUTION INTRAMUSCULAR; INTRAVENOUS AS NEEDED
Status: DISCONTINUED | OUTPATIENT
Start: 2022-07-15 | End: 2022-07-15 | Stop reason: HOSPADM

## 2022-07-15 RX ORDER — PROPOFOL 10 MG/ML
INJECTION, EMULSION INTRAVENOUS AS NEEDED
Status: DISCONTINUED | OUTPATIENT
Start: 2022-07-15 | End: 2022-07-15 | Stop reason: HOSPADM

## 2022-07-15 RX ORDER — OXYCODONE HYDROCHLORIDE 5 MG/1
5 TABLET ORAL ONCE
Status: COMPLETED | OUTPATIENT
Start: 2022-07-15 | End: 2022-07-15

## 2022-07-15 RX ORDER — NEOSTIGMINE METHYLSULFATE 1 MG/ML
INJECTION, SOLUTION INTRAVENOUS AS NEEDED
Status: DISCONTINUED | OUTPATIENT
Start: 2022-07-15 | End: 2022-07-15 | Stop reason: HOSPADM

## 2022-07-15 RX ORDER — OXYCODONE HYDROCHLORIDE 5 MG/1
5 TABLET ORAL
Qty: 15 TABLET | Refills: 0 | Status: SHIPPED | OUTPATIENT
Start: 2022-07-15 | End: 2022-07-22

## 2022-07-15 RX ORDER — ONDANSETRON 2 MG/ML
4 INJECTION INTRAMUSCULAR; INTRAVENOUS AS NEEDED
Status: DISCONTINUED | OUTPATIENT
Start: 2022-07-15 | End: 2022-07-15 | Stop reason: HOSPADM

## 2022-07-15 RX ORDER — HYDROMORPHONE HYDROCHLORIDE 1 MG/ML
0.2 INJECTION, SOLUTION INTRAMUSCULAR; INTRAVENOUS; SUBCUTANEOUS
Status: DISCONTINUED | OUTPATIENT
Start: 2022-07-15 | End: 2022-07-15 | Stop reason: HOSPADM

## 2022-07-15 RX ORDER — SODIUM CHLORIDE 0.9 % (FLUSH) 0.9 %
5-40 SYRINGE (ML) INJECTION EVERY 8 HOURS
Status: DISCONTINUED | OUTPATIENT
Start: 2022-07-15 | End: 2022-07-15 | Stop reason: HOSPADM

## 2022-07-15 RX ORDER — EPHEDRINE SULFATE/0.9% NACL/PF 50 MG/5 ML
SYRINGE (ML) INTRAVENOUS AS NEEDED
Status: DISCONTINUED | OUTPATIENT
Start: 2022-07-15 | End: 2022-07-15 | Stop reason: HOSPADM

## 2022-07-15 RX ORDER — DIPHENHYDRAMINE HYDROCHLORIDE 50 MG/ML
INJECTION, SOLUTION INTRAMUSCULAR; INTRAVENOUS AS NEEDED
Status: DISCONTINUED | OUTPATIENT
Start: 2022-07-15 | End: 2022-07-15 | Stop reason: HOSPADM

## 2022-07-15 RX ORDER — ROCURONIUM BROMIDE 10 MG/ML
INJECTION, SOLUTION INTRAVENOUS AS NEEDED
Status: DISCONTINUED | OUTPATIENT
Start: 2022-07-15 | End: 2022-07-15 | Stop reason: HOSPADM

## 2022-07-15 RX ORDER — GLYCOPYRROLATE 0.2 MG/ML
INJECTION INTRAMUSCULAR; INTRAVENOUS AS NEEDED
Status: DISCONTINUED | OUTPATIENT
Start: 2022-07-15 | End: 2022-07-15 | Stop reason: HOSPADM

## 2022-07-15 RX ORDER — LIDOCAINE HYDROCHLORIDE 20 MG/ML
INJECTION, SOLUTION EPIDURAL; INFILTRATION; INTRACAUDAL; PERINEURAL AS NEEDED
Status: DISCONTINUED | OUTPATIENT
Start: 2022-07-15 | End: 2022-07-15 | Stop reason: HOSPADM

## 2022-07-15 RX ORDER — POLYETHYLENE GLYCOL 3350 17 G/17G
17 POWDER, FOR SOLUTION ORAL DAILY
Status: SHIPPED | COMMUNITY
Start: 2022-07-15 | End: 2022-07-28 | Stop reason: ALTCHOICE

## 2022-07-15 RX ORDER — FENTANYL CITRATE 50 UG/ML
25 INJECTION, SOLUTION INTRAMUSCULAR; INTRAVENOUS
Status: DISCONTINUED | OUTPATIENT
Start: 2022-07-15 | End: 2022-07-15 | Stop reason: HOSPADM

## 2022-07-15 RX ORDER — SUCCINYLCHOLINE CHLORIDE 20 MG/ML
INJECTION INTRAMUSCULAR; INTRAVENOUS AS NEEDED
Status: DISCONTINUED | OUTPATIENT
Start: 2022-07-15 | End: 2022-07-15 | Stop reason: HOSPADM

## 2022-07-15 RX ORDER — IBUPROFEN 600 MG/1
600 TABLET ORAL
Qty: 25 TABLET | Refills: 1 | Status: SHIPPED | OUTPATIENT
Start: 2022-07-15 | End: 2022-07-20

## 2022-07-15 RX ORDER — OXYCODONE HYDROCHLORIDE 5 MG/1
TABLET ORAL
Status: DISCONTINUED
Start: 2022-07-15 | End: 2022-07-16 | Stop reason: HOSPADM

## 2022-07-15 RX ADMIN — ROCURONIUM BROMIDE 10 MG: 10 INJECTION INTRAVENOUS at 10:11

## 2022-07-15 RX ADMIN — Medication 10 MG: at 10:06

## 2022-07-15 RX ADMIN — FENTANYL CITRATE 100 MCG: 50 INJECTION, SOLUTION INTRAMUSCULAR; INTRAVENOUS at 09:46

## 2022-07-15 RX ADMIN — SODIUM CHLORIDE, POTASSIUM CHLORIDE, SODIUM LACTATE AND CALCIUM CHLORIDE: 600; 310; 30; 20 INJECTION, SOLUTION INTRAVENOUS at 10:53

## 2022-07-15 RX ADMIN — SUCCINYLCHOLINE CHLORIDE 100 MG: 20 INJECTION, SOLUTION INTRAMUSCULAR; INTRAVENOUS at 09:50

## 2022-07-15 RX ADMIN — WATER 2 G: 1 INJECTION INTRAMUSCULAR; INTRAVENOUS; SUBCUTANEOUS at 10:00

## 2022-07-15 RX ADMIN — OXYCODONE 5 MG: 5 TABLET ORAL at 12:30

## 2022-07-15 RX ADMIN — GLYCOPYRROLATE 1 MG: 0.2 INJECTION, SOLUTION INTRAMUSCULAR; INTRAVENOUS at 10:35

## 2022-07-15 RX ADMIN — KETOROLAC TROMETHAMINE 30 MG: 30 INJECTION, SOLUTION INTRAMUSCULAR; INTRAVENOUS at 10:34

## 2022-07-15 RX ADMIN — ROCURONIUM BROMIDE 30 MG: 10 INJECTION INTRAVENOUS at 10:00

## 2022-07-15 RX ADMIN — PROPOFOL 200 MG: 10 INJECTION, EMULSION INTRAVENOUS at 09:50

## 2022-07-15 RX ADMIN — LIDOCAINE HYDROCHLORIDE 100 MG: 20 INJECTION, SOLUTION EPIDURAL; INFILTRATION; INTRACAUDAL; PERINEURAL at 09:50

## 2022-07-15 RX ADMIN — Medication 5 MG: at 10:35

## 2022-07-15 RX ADMIN — DEXAMETHASONE SODIUM PHOSPHATE 12 MG: 4 INJECTION, SOLUTION INTRAMUSCULAR; INTRAVENOUS at 10:00

## 2022-07-15 RX ADMIN — SODIUM CHLORIDE, POTASSIUM CHLORIDE, SODIUM LACTATE AND CALCIUM CHLORIDE 25 ML/HR: 600; 310; 30; 20 INJECTION, SOLUTION INTRAVENOUS at 09:04

## 2022-07-15 RX ADMIN — FENTANYL CITRATE 50 MCG: 50 INJECTION, SOLUTION INTRAMUSCULAR; INTRAVENOUS at 10:28

## 2022-07-15 RX ADMIN — ONDANSETRON HYDROCHLORIDE 4 MG: 2 INJECTION, SOLUTION INTRAMUSCULAR; INTRAVENOUS at 10:00

## 2022-07-15 RX ADMIN — DIPHENHYDRAMINE HYDROCHLORIDE 50 MG: 50 INJECTION, SOLUTION INTRAMUSCULAR; INTRAVENOUS at 09:43

## 2022-07-15 RX ADMIN — FENTANYL CITRATE 50 MCG: 50 INJECTION, SOLUTION INTRAMUSCULAR; INTRAVENOUS at 10:53

## 2022-07-15 NOTE — ANESTHESIA POSTPROCEDURE EVALUATION
Procedure(s):  LAPAROSCOPIC CHOLECYSTECTOMY WITH CHOLANGIOGRAM.    general    Anesthesia Post Evaluation        Patient location during evaluation: PACU  Note status: Adequate. Level of consciousness: responsive to verbal stimuli and sleepy but conscious  Pain management: satisfactory to patient  Airway patency: patent  Anesthetic complications: no  Cardiovascular status: acceptable  Respiratory status: acceptable  Hydration status: acceptable  Comments: +Post-Anesthesia Evaluation and Assessment    Patient: Bret Mitchell MRN: [id number]  SSN: [id number]   YOB: 1970  Age: 46 y.o. Sex: male      Cardiovascular Function/Vital Signs    /71   Pulse (!) 59   Temp 36.4 °C (97.5 °F)   Resp 12   Ht 5' 10\" (1.778 m)   Wt 86.5 kg (190 lb 11.2 oz)   SpO2 97%   BMI 27.36 kg/m²     Patient is status post Procedure(s):  LAPAROSCOPIC CHOLECYSTECTOMY WITH CHOLANGIOGRAM.    Nausea/Vomiting: Controlled. Postoperative hydration reviewed and adequate. Pain:  Pain Scale 1: Numeric (0 - 10) (07/15/22 1145)  Pain Intensity 1: 2 (07/15/22 1145)   Managed. Neurological Status:   Neuro (WDL): Exceptions to WDL (07/15/22 1102)   At baseline. Mental Status and Level of Consciousness: Arousable. Pulmonary Status:   O2 Device: None (Room air) (07/15/22 1125)   Adequate oxygenation and airway patent. Complications related to anesthesia: None    Post-anesthesia assessment completed. No concerns. Signed By: Nina Limon MD    7/15/2022  Post anesthesia nausea and vomiting:  controlled      INITIAL Post-op Vital signs:   Vitals Value Taken Time   /67 07/15/22 1205   Temp 36.4 °C (97.5 °F) 07/15/22 1102   Pulse 63 07/15/22 1207   Resp 12 07/15/22 1207   SpO2 96 % 07/15/22 1207   Vitals shown include unvalidated device data.

## 2022-07-15 NOTE — H&P
Pre-Op History and Physical    Subjective:     Delta Alvarez is a 46 y.o. male who is here for Procedure(s):  LAPAROSCOPIC CHOLECYSTECTOMY WITH CHOLANGIOGRAM    Past Medical History:   Diagnosis Date    Back pain     GERD (gastroesophageal reflux disease)     Hypercholesteremia     Migraine 2009    Neck pain     PONV (postoperative nausea and vomiting)     Thyroid disease     nodule      Past Surgical History:   Procedure Laterality Date    HX APPENDECTOMY  1990    HX CERVICAL LAMINECTOMY      HX CERVICAL LAMINECTOMY  2016    HX ORTHOPAEDIC      Neck and lumbar back surgeries: 2008, 2014, 2016    HX SHOULDER ARTHROSCOPY Left 2021    HX TUMOR REMOVAL  2003    Fatty tumors    HX VASECTOMY       Family History   Problem Relation Age of Onset    Cancer Mother         uterine    Other Father         ALS    Stroke Maternal Grandfather     Cancer Maternal Grandfather     Dementia Paternal Grandmother     Dementia Paternal Grandfather     Cancer Maternal Grandmother         breast      Social History     Tobacco Use    Smoking status: Never Smoker    Smokeless tobacco: Never Used   Substance Use Topics    Alcohol use: Not Currently       Prior to Admission medications    Medication Sig Start Date End Date Taking? Authorizing Provider   multivitamin (ONE A DAY) tablet Take 1 Tablet by mouth daily. Yes Provider, Historical   cyanocobalamin/cobamamide (B12 SL) 2,500 Units by SubLINGual route daily. Yes Provider, Historical   famotidine (PEPCID PO) Take  by mouth. Yes Provider, Historical   metoprolol succinate (TOPROL-XL) 50 mg XL tablet Take 1 Tablet by mouth daily. Replaces propranolol 1 week after starting event monitor. Patient taking differently: Take 50 mg by mouth nightly. Replaces propranolol 1 week after starting event monitor.  6/7/22  Yes Peggy Silva MD   Emgality Pen 120 mg/mL injection INJECT 1 MILLILITER SUBCUTANEOUSLY EVERY 30 DAYS  Patient taking differently: 120 mg by SubCUTAneous route every thirty (30) days. 4/21/22  Yes Kenya Khan NP   calcium carbonate (TUMS PO) Take  by mouth nightly as needed. Yes Provider, Historical   rizatriptan (MAXALT) 10 mg tablet Take 1 Tablet by mouth every eight (8) hours as needed for Migraine. May repeat in 2 hours if needed 4/5/22  Yes Dustin Vigil MD   ibuprofen (MOTRIN) 800 mg tablet Take 1 Tab by mouth every six (6) hours as needed for Pain. Patient taking differently: Take 600 mg by mouth every six (6) hours as needed for Pain. 7/14/20  Yes Kenya Khan NP   simvastatin (ZOCOR) 10 mg tablet 10 mg nightly. 12/30/19  Yes Provider, Historical   cholecalciferol, vitamin D3, (VITAMIN D3) 2,000 unit tab Take 1,500 Units by mouth daily. Yes Provider, Historical   calcium carb/magnesium hydrox (MYLANTA PO) Take  by mouth. Provider, Historical   omeprazole (PRILOSEC) 20 mg capsule Take 20 mg by mouth daily. 3/29/22   Provider, Historical   simethicone (GAS-X) 125 mg capsule Take 125 mg by mouth four (4) times daily as needed for Flatulence. Provider, Historical     Allergies   Allergen Reactions    Contrast Dye [Iodine] Hives    Lyrica [Pregabalin] Other (comments)     Behavior change        Review of Systems:  A comprehensive review of systems was negative except for that written in the History of Present Illness. Objective: Intake and Output:    No intake/output data recorded. No intake/output data recorded. Physical Exam:   Lungs: clear to auscultation bilaterally  Heart: regular rate and rhythm  Abdomen: soft, non-tender.  No masses,  no organomegaly        Data Review:   Recent Results (from the past 24 hour(s))   CBC W/O DIFF    Collection Time: 07/15/22  8:23 AM   Result Value Ref Range    WBC 6.9 4.1 - 11.1 K/uL    RBC 4.87 4.10 - 5.70 M/uL    HGB 14.7 12.1 - 17.0 g/dL    HCT 45.4 36.6 - 50.3 %    MCV 93.2 80.0 - 99.0 FL    MCH 30.2 26.0 - 34.0 PG    MCHC 32.4 30.0 - 36.5 g/dL    RDW 13.0 11.5 - 14.5 % PLATELET 826 651 - 485 K/uL    MPV 9.9 8.9 - 12.9 FL    NRBC 0.0 0  WBC    ABSOLUTE NRBC 0.00 0.00 - 4.89 K/uL   METABOLIC PANEL, COMPREHENSIVE    Collection Time: 07/15/22  8:23 AM   Result Value Ref Range    Sodium 142 136 - 145 mmol/L    Potassium 4.1 3.5 - 5.1 mmol/L    Chloride 109 (H) 97 - 108 mmol/L    CO2 28 21 - 32 mmol/L    Anion gap 5 5 - 15 mmol/L    Glucose 96 65 - 100 mg/dL    BUN 15 6 - 20 MG/DL    Creatinine 1.12 0.70 - 1.30 MG/DL    BUN/Creatinine ratio 13 12 - 20      GFR est AA >60 >60 ml/min/1.73m2    GFR est non-AA >60 >60 ml/min/1.73m2    Calcium 9.2 8.5 - 10.1 MG/DL    Bilirubin, total 0.8 0.2 - 1.0 MG/DL    ALT (SGPT) 29 12 - 78 U/L    AST (SGOT) 19 15 - 37 U/L    Alk. phosphatase 69 45 - 117 U/L    Protein, total 7.6 6.4 - 8.2 g/dL    Albumin 3.7 3.5 - 5.0 g/dL    Globulin 3.9 2.0 - 4.0 g/dL    A-G Ratio 0.9 (L) 1.1 - 2.2     LIPASE    Collection Time: 07/15/22  8:23 AM   Result Value Ref Range    Lipase 187 73 - 393 U/L           Assessment:     Active Problems:    * No active hospital problems. *      Plan:          I Recommend we proceed with a Laparoscopic Cholecystectomy with Intraoperative Cholangiogram.    Risks, Benefits, and Alternatives of the procedure were discussed with Eli Sears including:  the risk of the anesthesia, bleeding, infection, injury to the intestines, injury to the ducts, conversion to an open procedure, and the lack of symptomatic improvement. The patient is agreeable to proceed.     Labs ok.           Signed By: Elsa Ferrera MD     July 15, 2022

## 2022-07-15 NOTE — PERIOP NOTES
1220 Discharge instructions reviewed with wife via cell phone. Allowed time for questions and answers. 1240 Assisted with dressing and up to the wheelchair. PIV Discontinued and tolerated well. Discharged via wheelchair with all belongings.

## 2022-07-15 NOTE — OP NOTES
[facility] REPORT    7/15/2022        Pre-operative Diagnosis: cholelithiasis      Post-operative Diagnosis: cholelithiasis      OPERATIVE PROCEDURE:  1. Laparoscopic cholecystectomy. 2.  Intraoperative cholangiogram with intraoperative interpritation. Surgeon: Mati Ray MD    Assistant: Circ-1: Pedro Pablo Camejo  Scrub Tech-1: Kostas CAMACHO  Scrub RN-1: Fidel Lozano RN    Anesthesia: General plus Local    Estimated Blood Loss: Minimal    FINDINGS:   The patient was noted to have a gallbladder with stones and bile. Intraoperative cholangiogram was obtained. Radiologist was not present during the case. Intraoperative interpretation revealed filling of a normal length cystic duct, a common bile duct, and all proximal and distal structures with free flow of contrast into the duodenum without any filling defects noted. The liver appeared normal, and the remaining abdominal cavity appeared normal.    SPECIMEN:    ID Type Source Tests Collected by Time Destination   1 : Gallbladder Preservative Gallbladder  Evangelist Isaacs MD 7/15/2022 1020 Pathology        DRAINS:  None. COMPLICATIONS:  None. DESCRIPTION OF PROCEDURE:  After satisfactory induction of general  anesthesia, the patient was kept in the supine position. The patient's  abdomen was prepped and draped sterilely. After infusion of the skin with local anesthetic, a small incision was made  and a supraumbilical 5-mm trocar was placed intra-abdominally under  visualization. Pneumoperitoneum was achieved. The abdomen was explored  with findings noted above. A 12-mm port was placed in the patient's  epigastrium and an additional 5-mm port was placed in the patient's right  abdomen. The gallbladder was retracted superiorly and laterally. The  reflection of the peritoneum was brought down exposing Calot's triangle.   The cystic duct was  from surrounding tissues with the critical view obtained, including the cystic duct and the common bile duct junction. A single clip was placed at the junction of the cystic duct and gallbladder. The cystic artery was also clearly identified,  from its surrounding tissues, it branched on the surface of the gallbladder. It was clipped with 2 clips. Next, a small ductotomy was created and intraoperative cholangiogram was obtained with the findings noted above. The cholangiogram catheter was removed. Three clips were placed just distal to the ductotomy on the cystic duct and the cystic duct and the cystic artery were transected. The spatula-tipped Bovie was used to separate the gallbladder from its bed in  the liver. Once freed, irrigation of the right upper quadrant and final  irrigation running clear, complete hemostasis was noted. Clips noted to be  in place without any extravasation of blood or bile. The pelvis was visualized and  noted to be normal.  7 mL of 0.5 Marcaine with epinephrine was sprayed over dome of the liver. The gallbladder was retrieved through the epigastric port site using an endopouch. The remaining trocar sites were removed sequentially under visualization, and the pneumoperitoneum was allowed to escape. A figure-of-eight 0 Vicryl suture was used to close the epigastic fascial defect, followed by additional Vicryl  and local anesthetic in the soft tissue, followed by closure of all skin incisions with subcuticular 4-0 Monocryl and Dermabond. The patient remained stable during my presence in the operating room.       Essie Comer MD

## 2022-07-15 NOTE — PERIOP NOTES
Dr. Joshua Moses aware of patient's IV contrast dye allergy.  Per MD amos to use conray for cholangiogram.

## 2022-07-15 NOTE — DISCHARGE INSTRUCTIONS
Dr Sinai Perera Discharge Instructions after  Laparoscopic Cholecystectomy      Matt Humphrey [id number] : 1970    Admitted 7/15/2022 Discharged: 7/15/2022       What to do at Home    Recommended diet: Low Fat Diet for 1 month    Recommended activity: as tolerated, do not drive for 3-5 days while on pain medicine. Follow-up with Dr. Renato Baum in 2 weeks. Call [phone] for an appointment. Cholecystectomy: What to Expect at [facility]    After your surgery, it is normal to feel weak and tired for several days after you return home. Your belly may be swollen. If you had laparoscopic surgery, you may also have pain in your shoulder for about 24 hours. You may have gas or need to burp a lot at first, and a few people get diarrhea. The diarrhea usually goes away in 2 to 4 weeks, but it may last longer. How quickly you recover depends on whether you had a laparoscopic or open surgery. For a laparoscopic surgery, most people can go back to work or their normal routine in 1 to 2 weeks, but it may take longer, depending on the type of work you do. For an open surgery, it will probably take 4 to 6 weeks before you get back to your normal routine. This care sheet gives you a general idea about how long it will take for you to recover. However, each person recovers at a different pace. Follow the steps below to get better as quickly as possible. How can you care for yourself at home? Activity  Rest when you feel tired. Getting enough sleep will help you recover. Try to walk each day. Start out by walking a little more than you did the day before. Gradually increase the amount you walk. Walking boosts blood flow and helps prevent pneumonia and constipation. Avoid strenuous activities, such as biking, jogging, weightlifting, and aerobic exercise, for 1-2 weeks. You may shower 24 hours after surgery. Pat the cut (incision) dry.  Do not take a bath for the first 2 weeks, or until your doctor tells you it is okay. You may drive when you are no longer taking pain medicine and can quickly move your foot from the gas pedal to the brake. You must also be able to sit comfortably for a long period of time, even if you do not plan to go far. For a laparoscopic surgery, most people can go back to work or their normal routine in 1 to 2 weeks, but it may take longer. For an open surgery, it will probably take 4 to 6 weeks before you get back to your normal routine. Diet  Eat smaller meals more often instead of fewer larger meals. You can eat a normal diet, but avoid eating fatty foods for about 1 month. Fatty foods include hamburger, whole milk, cheese, and many snack foods. If your stomach is upset, try bland, low-fat foods like plain rice, broiled chicken, toast, and yogurt. Drink plenty of fluids (unless your doctor tells you not to). If you have diarrhea, try avoiding spicy foods, dairy products, fatty foods, and alcohol. You can also watch to see if specific foods cause it, and stop eating them. If the diarrhea continues for more than 2 weeks, talk to your doctor. You may notice that your bowel movements are not regular right after your surgery. This is common. Try to avoid constipation and straining with bowel movements. You may want to take a fiber supplement every day. If you have not had a bowel movement after a couple of days, ask your doctor about taking a mild laxative. Medicines  Take pain medicines exactly as directed. If the doctor gave you a prescription medicine for pain, take it as prescribed. If you are not taking a prescription pain medicine, take an over-the-counter medicine such as acetaminophen (Tylenol), ibuprofen (Advil, Motrin), or naproxen (Aleve). Read and follow all instructions on the label. Do not take two or more pain medicines at the same time unless the doctor told you to. Many pain medicines contain acetaminophen, which is Tylenol.  Too much Tylenol can be harmful. If you think your pain medicine is making you sick to your stomach: Take your medicine after meals (unless your doctor tells you not to). Ask your doctor for a different pain medicine. If your doctor prescribed antibiotics, take them as directed. Do not stop taking them just because you feel better. You need to take the full course of antibiotics. Incision care  After 24 to 48 hours, wash the area daily with warm, soapy water, and pat it dry. You may have staples to hold the cut together. Keep them dry until your doctor takes them out. This is usually in 7 to 10 days. Keep the area clean and dry. You may cover it with a gauze bandage if it weeps or rubs against clothing. Change the bandage every day. Ice  To reduce swelling and pain, put ice or a cold pack on your belly for 10 to 15 minutes at a time. Do this every 1 to 2 hours. Put a thin cloth between the ice and your skin. Follow-up care is a key part of your treatment and safety. Be sure to make and go to all appointments, and call your doctor if you are having problems. Its also a good idea to know your test results and keep a list of the medicines you take. When should you call for help? Call 911 anytime you think you may need emergency care. For example, call if:  You pass out (lose consciousness). You have severe trouble breathing. You have sudden chest pain and shortness of breath, or you cough up blood. Call your doctor now or seek immediate medical care if:  You are sick to your stomach and cannot drink fluids. You have pain that does not get better when you take your pain medicine. You have signs of infection, such as: Increased pain, warmth, or excessive (>1inch) redness. Red streaks leading from the incision. Pus draining from the incision. Swollen lymph nodes in your neck, armpits, or groin. A fever. Your urine turns dark brown or your stool is light-colored or jeremiah-colored.   Your skin turns yellow. Bright red blood has soaked through a large bandage over your incision. You have signs of a blood clot, such as:  Pain in your calf, back of knee, thigh, or groin. Redness and swelling in your leg or groin. You have trouble passing urine or stool, especially if you have mild pain or swelling in your lower belly. Watch closely for any changes in your health, and be sure to contact your doctor if:  You do not have a bowel movement after taking a laxative. How to Care for Your Wound After Its Treated With  DERMABOND* Topical Skin Adhesive  DERMABOND* Topical Skin Adhesive (2-octyl cyanoacrylate) is a sterile, liquid skin adhesive  that holds wound edges together. The film will usually remain in place for 5 to 10 days, then  naturally fall off your skin. The following will answer some of your questions and provide instructions for proper care for your  wound while it is healing:    CHECK WOUND APPEARANCE   Some swelling, redness, and pain are common with all wounds and normally will go away as the  wound heals. If swelling, redness, or pain increases or if the wound feels warm to the touch,  contact a doctor. Also contact a doctor if the wound edges reopen or separate. REPLACE BANDAGES   If your wound is bandaged, keep the bandage dry.  Replace the dressing daily until the adhesive film has fallen off or if the  bandage should become wet, unless otherwise instructed by your  physician.  When changing the dressing, do not place tape directly over the  DERMABOND adhesive film, because removing the tape later may also  remove the film. AVOID TOPICAL MEDICATIONS   Do not apply liquid or ointment medications or any other product to your wound while the  DERMABOND adhesive film is in place. These may loosen the film before your wound is healed. KEEP WOUND DRY AND PROTECTED   You may occasionally and briefly wet your wound in the shower or bath.  Do not soak or scrub  your wound, do not swim, and avoid periods of heavy perspiration until the DERMABOND  adhesive has naturally fallen off. After showering or bathing, gently blot your wound dry with a  soft towel. If a protective dressing is being used, apply a fresh, dry bandage, being sure to keep  the tape off the DERMABOND adhesive film.  Apply a clean, dry bandage over the wound if necessary to protect it.  Protect your wound from injury until the skin has had sufficient time to heal.   Do not scratch, rub, or pick at the DERMABOND adhesive film. This may loosen the film before  your wound is healed.  Protect the wound from prolonged exposure to sunlight or tanning lamps while the film is in  place. If you have any questions or concerns about this product, please consult your doctor. *Trademark ©[employer] 2002    DISCHARGE SUMMARY from Nurse    PATIENT INSTRUCTIONS:    After general anesthesia or intravenous sedation, for 24 hours or while taking prescription narcotics:    · Have someone responsible help you with your care  · Limit your activities  · Do not drive and operate hazardous machinery  · Do not make important personal, legal or business decisions  · Do not drink alcoholic beverages  · If you have not urinated within 8 hours after discharge, please contact your surgeon on call  · Resume your medications unless otherwise instructed    From general anesthesia, intravenous sedation, or while taking prescription narcotics, you may experience:    · Drowsiness, dizziness, sleepiness, or confusion  · Difficulty remembering or delayed reaction times  · Difficulty with your balance, especially while walking, move slowly and carefully, do not make sudden position changes  · Difficulty focusing or blurred vision    You may not be aware of slight changes in your behavior and/or your reaction time because of the medication used during and after your procedure.     Report the following to your surgeon:  · Excessive pain, swelling, redness or odor of or around the surgical area  · Temperature over 100.5  · Nausea and vomiting lasting longer than 4 hours or if unable to take medications  · Any signs of decreased circulation or nerve impairment to extremity: change in color, persistent numbness, tingling, coldness or increase pain  · Any questions or concerns         IF YOU REPORT TO AN EMERGENCY ROOM, DOCTOR'S OFFICE OR HOSPITAL WITHIN 24 HOURS AFTER YOUR PROCEDURE, BRING THIS SHEET AND YOUR AFTER VISIT SUMMARY WITH YOU AND GIVE IT TO THE PHYSICIAN OR NURSE ATTENDING YOU. These are general instructions for a healthy lifestyle (if applicable): No smoking/ No tobacco products/ Avoid exposure to secondhand smoke  Surgeon General's Warning:  Quitting smoking now greatly reduces serious risk to your health. Obesity, smoking, and sedentary lifestyle greatly increases your risk for illness    A healthy diet, regular physical exercise & weight monitoring are important for maintaining a healthy lifestyle    You may be retaining fluid if you have a history of heart failure or if you experience any of the following symptoms:  Weight gain of 3 pounds or more overnight or 5 pounds in a week, increased swelling in our hands or feet or shortness of breath while lying flat in bed. Please call your doctor as soon as you notice any of these symptoms; do not wait until your next office visit. A common side effect of anesthesia following surgery is nausea and/or vomiting. In order to decrease symptoms, it is wise to avoid foods that are high in fat, greasy foods, milk products, and spicy foods for the first 24 hours. Acceptable foods for the first 24 hours following surgery include but are not limited to:    · soup  · broth  · toast   · crackers   · applesauce  · bananas   · mashed potatoes,  · soft or scrambled eggs  · oatmeal  · jello    It is important to eat when taking your pain medication. This will help to prevent nausea.  If possible, please try to time your meals with your medications. It is very important to stay hydrated following surgery. Sip fluids frequently while awake. Avoid acidic drinks such as citrus juices and soda for 24 hours. Carbonated beverages may cause bloating and gas. Acceptable fluids include:    · water (flavor packets may add variety)  · coffee or tea (in moderation)  · Gatorade  · Giuseppe-Aid  · apple juice  · cranberry juice    You are encouraged to cough and deep breathe every hour when awake. This will help to prevent respiratory complications following anesthesia. You may want to hug a pillow when coughing and sneezing to add additional support to the surgical area and to decrease discomfort if you had abdominal or chest surgery. If you are discharged home with support stockings, you may remove them after 24 hours. Support stockings are used to help prevent blood clots in the legs following surgery. TO PREVENT AN INFECTION      1. [address] YOUR HANDS     To prevent infection, good handwashing is the most important thing you or your caregiver can do.  Wash your hands with soap and water or use the hand  we gave you before you touch any wounds. 2. SHOWER     Use the antibacterial soap we gave you when you take a shower.  Shower with this soap until your wounds are healed.  To reach all areas of your body, you may need someone to help you.  Dont forget to clean your belly button with every shower. 3.  USE CLEAN SHEETS     Use freshly cleaned sheets on your bed after surgery.  To keep the surgery site clean, do not allow pets to sleep with you while your wound is still healing. 4. STOP SMOKING     Stop smoking, or at least cut back on smoking     Smoking slows your healing. 5.  CONTROL YOUR BLOOD SUGAR     High blood sugars slow wound healing.  If you are diabetic, control your blood sugar levels before and after your surgery.     Please take time to review all of your Home Care Instructions and Medication Information sheets provided in your discharge packet. If you have any questions, please contact your surgeon's office. Thank you. The discharge information has been reviewed with the patient and instruction recipient. The patient and instruction recipient verbalized understanding. Discharge medications reviewed with the patient and instruction recipient and appropriate educational materials and side effects teaching were provided. Please provide this summary of care documentation to your next provider. Patient Education   Oxycodone, Rapid Release (By mouth)   Oxycodone Hydrochloride (ox-t-RUP-done ayah-droe-KLOR-kamini)  Treats moderate to severe pain. This medicine is a narcotic pain reliever. Brand Name(s): Oxaydo, Oxy IR, Roxicodone   There may be other brand names for this medicine. When This Medicine Should Not Be Used: This medicine is not right for everyone. Do not use it if you had an allergic reaction to oxycodone, codeine, hydrocodone, dihydrocodeine, or morphine, or you have a stomach or bowel blockage. How to Use This Medicine:   Capsule, Liquid, Tablet  · Take your medicine as directed. Your dose may need to be changed several times to find what works best for you. · An overdose can be dangerous. Follow directions carefully so you do not get too much medicine at one time. · Oral liquid: Measure the oral liquid medicine with a marked measuring spoon, oral syringe, or medicine cup. · Oxaydo® tablet: Swallow it whole with enough water to swallow it completely. Do not break, crush, chew, or dissolve it. Do not wet the tablet before you put it in your mouth. · This medicine should come with a Medication Guide. Ask your pharmacist for a copy if you do not have one. · Missed dose: Take a dose as soon as you remember. If it is almost time for your next dose, wait until then and take a regular dose.  Do not take extra medicine to make up for a missed dose.  · Store the medicine in a closed container at room temperature, away from heat, moisture, and direct light. Store the medicine in a secure place to prevent others from getting it. Ask your pharmacist about the best way to dispose of medicine you do not use. Drugs and Foods to Avoid:   Ask your doctor or pharmacist before using any other medicine, including over-the-counter medicines, vitamins, and herbal products. · Do not use this medicine if you are using or have used an MAO inhibitor within the past 14 days. · Some medicines can affect how oxycodone works. Tell your doctor if you are using any of the following:  ¨ Amiodarone, carbamazepine, erythromycin, ketoconazole, phenytoin, quinidine, rifampin, ritonavir  ¨ Diuretic (water pill)  ¨ Medicine to treat depression or anxiety  ¨ Medicine to treat migraine headaches  ¨ Phenothiazine medicine  · Tell your doctor if you use anything else that makes you sleepy. Some examples are allergy medicine, narcotic pain medicine, and alcohol. Tell your doctor if you are using buprenorphine, butorphanol, nalbuphine, pentazocine, or a muscle relaxer. · Do not drink alcohol while you are using this medicine. Warnings While Using This Medicine:   · Tell your doctor if you are pregnant or breastfeeding, or if you have kidney disease, liver disease, heart disease, low blood pressure, lung disease or breathing problems (such as asthma, COPD), scoliosis, an enlarged prostate or trouble urinating, an underactive thyroid, Pantera disease, gallbladder or pancreas problems, or digestion problems. Tell your doctor if you have a history of head injury, brain tumor, mental health problems, seizures, or alcohol or drug addiction.   · This medicine may cause the following problems:  ¨ High risk of overdose, which can lead to death  ¨ Respiratory depression (serious breathing problem that can be life-threatening)  ¨ Serotonin syndrome, when used with certain medicines  · This medicine may make you dizzy, drowsy, or faint. Do not drive or do anything else that could be dangerous until you know how this medicine affects you. Sit or lie down if you feel dizzy. Stand up carefully. · This medicine can be habit-forming. Do not use more than your prescribed dose. Call your doctor if you think your medicine is not working. · Do not stop using this medicine suddenly. Your doctor will need to slowly decrease your dose before you stop it completely. · This medicine may cause constipation, especially with long-term use. Ask your doctor if you should use a laxative to prevent and treat constipation. Drink plenty of liquids to help avoid constipation. · This medicine could cause infertility. Talk with your doctor before using this medicine if you plan to have children. · Keep all medicine out of the reach of children. Never share your medicine with anyone. Possible Side Effects While Using This Medicine:   Call your doctor right away if you notice any of these side effects:  · Allergic reaction: Itching or hives, swelling in your face or hands, swelling or tingling in your mouth or throat, chest tightness, trouble breathing  · Anxiety, restlessness, fast heartbeat, fever, sweating, muscle spasms, twitching, nausea, vomiting, diarrhea, seeing or hearing things that are not there  · Blue lips, fingernails, or skin, trouble breathing  · Extreme dizziness or weakness, shallow breathing, slow heartbeat, sweating, cold or clammy skin, seizures  · Lightheadedness, dizziness, fainting  · Severe constipation, stomach pain  If you notice these less serious side effects, talk with your doctor:   · Mild constipation  · Sleepiness, tiredness  If you notice other side effects that you think are caused by this medicine, tell your doctor. Call your doctor for medical advice about side effects.  You may report side effects to FDA at 5-073-FDA-3786  © 2017 [facility] Information is for End User's use only and may not be sold, redistributed or otherwise used for commercial purposes. The above information is an  only. It is not intended as medical advice for individual conditions or treatments. Talk to your doctor, nurse or pharmacist before following any medical regimen to see if it is safe and effective for you.

## 2022-07-15 NOTE — PERIOP NOTES
26:  Patient to OR Holding. Consents reviewed with the patient. He verbalizes understanding of all info provided. Signatures obtained form surgery & anesthesia. 0800:  Patient up to BR to change in to surgical gown & empty bladder.

## 2022-07-15 NOTE — PERIOP NOTES
Handoff Report from Operating Room to PACU    Report received from Delbert De La Rosa RN and Tessie Kent CRNA regarding Eli Sears. Surgeon(s):  Ligia Barajas MD  And Procedure(s) (LRB):  LAPAROSCOPIC CHOLECYSTECTOMY WITH CHOLANGIOGRAM (N/A)  confirmed   with dressings discussed. Anesthesia type, drugs, patient history, complications, estimated blood loss, vital signs, intake and output, and last pain medication, lines and temperature were reviewed.

## 2022-07-28 ENCOUNTER — OFFICE VISIT (OUTPATIENT)
Dept: SURGERY | Age: 52
End: 2022-07-28
Payer: COMMERCIAL

## 2022-07-28 VITALS
BODY MASS INDEX: 27.86 KG/M2 | WEIGHT: 194.6 LBS | OXYGEN SATURATION: 98 % | SYSTOLIC BLOOD PRESSURE: 110 MMHG | DIASTOLIC BLOOD PRESSURE: 77 MMHG | RESPIRATION RATE: 16 BRPM | HEIGHT: 70 IN | TEMPERATURE: 97.3 F | HEART RATE: 76 BPM

## 2022-07-28 DIAGNOSIS — Z09 POSTOPERATIVE EXAMINATION: Primary | ICD-10-CM

## 2022-07-28 PROCEDURE — 99024 POSTOP FOLLOW-UP VISIT: CPT | Performed by: SURGERY

## 2022-07-28 NOTE — PROGRESS NOTES
Chief Complaint   Patient presents with    Surgical Follow-up     S/p lap radha with IOC On 7/15/22     Reviewed pathology and provided a copy: Mild chronic cholecystitis with gallstones    IOC: ok    Tolerating PO  BMs ok    Pain controlled  Taking no pain meds      Physical Exam:   Abdominal exam: soft  non-distended  Non-tender. Wound: clean, dry, no drainage    Doing great  Continue unrestricted activity.   Follow-up: doug Snow MD FACS

## 2022-07-28 NOTE — PROGRESS NOTES
Chief Complaint   Patient presents with    Surgical Follow-up     S/p lap radha with Intraoperative cholangiogram with intraoperative interpritation. On 7/15/22       1. Have you been to the ER, urgent care clinic since your last visit? Hospitalized since your last visit? No    2. Have you seen or consulted any other health care providers outside of the [address] [place] [address] since your last visit? Include any pap smears or colon screening.  No

## 2022-07-28 NOTE — Clinical Note
7/28/2022    Patient: Michael Marin   YOB: 1970   Date of Visit: 7/28/2022     Maryam Degroot MD  [address]  [address]  [place] [address]  Via Fax: [phone]     Yessy Anne MD  [address]  [address] [place] [address]  [place]    Dear MD Yessy Koehler MD,      Thank you for referring Mr. Ernesto Olsen to  [facility] for evaluation. My notes for this consultation are attached. If you have questions, please do not hesitate to call me. I look forward to following your patient along with you.       Sincerely,    Clemente Montero MD

## 2022-08-01 ENCOUNTER — TELEPHONE (OUTPATIENT)
Dept: CARDIOLOGY CLINIC | Age: 52
End: 2022-08-01

## 2022-08-01 NOTE — TELEPHONE ENCOUNTER
Verified Patient with two identifiers  Patient stated being outside watching a game and started feeling his face hot and his heart raised to 120 bpm. Patient stated that was very hot that afternoon and felt better after he was taking to a place with [facility], stated. Patient denied chest pain, palpitation, SOB or dizziness at that time. This nurse recommended to keep taking Metoprolol as ordered and to call if symptoms symptoms come back or worsen. Patient voiced understanding.

## 2022-08-01 NOTE — TELEPHONE ENCOUNTER
Patient was out at a baseball game on Friday and he felt his heart racing and it went up to 120 at resting. Patient also was seen by the medics at the game. BP was 150/90. Patient was concerned about his heart racing while at rest.Please advise.     [phone]

## 2022-09-13 ENCOUNTER — OFFICE VISIT (OUTPATIENT)
Dept: CARDIOLOGY CLINIC | Age: 52
End: 2022-09-13
Payer: COMMERCIAL

## 2022-09-13 VITALS
BODY MASS INDEX: 28.35 KG/M2 | WEIGHT: 198 LBS | SYSTOLIC BLOOD PRESSURE: 110 MMHG | HEART RATE: 77 BPM | RESPIRATION RATE: 16 BRPM | HEIGHT: 70 IN | OXYGEN SATURATION: 99 % | DIASTOLIC BLOOD PRESSURE: 80 MMHG

## 2022-09-13 DIAGNOSIS — I47.1 PSVT (PAROXYSMAL SUPRAVENTRICULAR TACHYCARDIA) (HCC): Primary | ICD-10-CM

## 2022-09-13 DIAGNOSIS — K21.9 GASTROESOPHAGEAL REFLUX DISEASE, UNSPECIFIED WHETHER ESOPHAGITIS PRESENT: ICD-10-CM

## 2022-09-13 DIAGNOSIS — R93.1 AGATSTON CAC SCORE, <100: ICD-10-CM

## 2022-09-13 DIAGNOSIS — E78.2 MIXED HYPERLIPIDEMIA: ICD-10-CM

## 2022-09-13 DIAGNOSIS — I47.1 SVT (SUPRAVENTRICULAR TACHYCARDIA) (HCC): ICD-10-CM

## 2022-09-13 DIAGNOSIS — R07.2 PRECORDIAL CHEST PAIN: ICD-10-CM

## 2022-09-13 PROCEDURE — 99214 OFFICE O/P EST MOD 30 MIN: CPT | Performed by: INTERNAL MEDICINE

## 2022-09-13 PROCEDURE — 93000 ELECTROCARDIOGRAM COMPLETE: CPT | Performed by: INTERNAL MEDICINE

## 2022-09-13 RX ORDER — GABAPENTIN 100 MG/1
CAPSULE ORAL
COMMUNITY
Start: 2022-09-08

## 2022-09-13 NOTE — PROGRESS NOTES
[address]  [phone]     Subjective:      Elisabeth Bunn is a 46 y.o. male is here for routine f/u. Pmhx HTN, HLD, SVT, migraine and GERD. Last seen by us in 6/2022. Echo and event monitor unremarkable 7/2022. Recall stress echo 4/2022 was fine. He is S/p Laparoscopic cholecystectomy performed by Dr Jabari Walter in 7/15/2022    Today no further cardiac complaints. The patient denies chest pain/ shortness of breath, orthopnea, PND, LE edema, palpitations, syncope, presyncope or fatigue. 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 01/07/2020    Cerebral microvascular disease 01/07/2020    Migraine 2009      Robert Sharma MD  Past Medical History:   Diagnosis Date    Back pain     GERD (gastroesophageal reflux disease)     Hypercholesteremia     Migraine 2009    Neck pain     PONV (postoperative nausea and vomiting)     Thyroid disease     nodule      Past Surgical History:   Procedure Laterality Date    HX APPENDECTOMY  1990    HX CERVICAL LAMINECTOMY      HX CERVICAL LAMINECTOMY  2016    HX CHOLECYSTECTOMY      HX LAP CHOLECYSTECTOMY  07/15/2022    lap radha with Intraoperative cholangiogram with intraoperative interpritation.     HX LAP CHOLECYSTECTOMY  07/15/2022    Dr. Griffin Vargas [facility]      Neck and lumbar back surgeries: 2008, 2014, 2016    HX SHOULDER ARTHROSCOPY Left 2021    HX TUMOR REMOVAL  2003    Fatty tumors    HX VASECTOMY       Allergies   Allergen Reactions    Contrast Dye [Iodine] Hives    Lyrica [Pregabalin] Other (comments)     Behavior change      Family History   Problem Relation Age of Onset    Cancer Mother         uterine    Other Father         ALS    Stroke Maternal Grandfather     Cancer Maternal Grandfather     Dementia Paternal Grandmother     Dementia Paternal Grandfather     Cancer Maternal Grandmother         breast      Social History     Socioeconomic History    Marital status:      Spouse name: Not on file    Number of children: Not on file    Years of education: Not on file    Highest education level: Not on file   Occupational History    Not on file   Tobacco Use    Smoking status: Never    Smokeless tobacco: Never   Vaping Use    Vaping Use: Never used   Substance and Sexual Activity    Alcohol use: Not Currently    Drug use: Never    Sexual activity: Yes     Partners: Female   Other Topics Concern    Not on file   Social History Narrative    Not on file     Social Determinants of Health     Financial Resource Strain: Not on file   Food Insecurity: Not on file   Transportation Needs: Not on file   Physical Activity: Not on file   Stress: Not on file   Social Connections: Not on file   Intimate Partner Violence: Not on file   Housing Stability: Not on file      Current Outpatient Medications   Medication Sig    acetaminophen (TYLENOL) 500 mg tablet Take 2 Tablets by mouth four (4) times daily. multivitamin (ONE A DAY) tablet Take 1 Tablet by mouth daily. cyanocobalamin/cobamamide (B12 SL) 2,500 Units by SubLINGual route daily. famotidine (PEPCID PO) Take  by mouth.    calcium carb/magnesium hydrox (MYLANTA PO) Take  by mouth. (Patient not taking: Reported on 7/28/2022)    metoprolol succinate (TOPROL-XL) 50 mg XL tablet Take 1 Tablet by mouth daily. Replaces propranolol 1 week after starting event monitor. (Patient taking differently: Take 50 mg by mouth nightly. Replaces propranolol 1 week after starting event monitor.)    omeprazole (PRILOSEC) 20 mg capsule Take 20 mg by mouth daily.  (Patient not taking: Reported on 7/28/2022)    Emgality Pen 120 mg/mL injection INJECT 1 MILLILITER SUBCUTANEOUSLY EVERY 30 DAYS (Patient taking differently: 120 mg by SubCUTAneous route every thirty (30) days.)    calcium carbonate (TUMS PO) Take  by mouth nightly as needed. (Patient not taking: Reported on 7/28/2022)    simethicone (GAS-X) 125 mg capsule Take 125 mg by mouth four (4) times daily as needed for Flatulence. rizatriptan (MAXALT) 10 mg tablet Take 1 Tablet by mouth every eight (8) hours as needed for Migraine. May repeat in 2 hours if needed    ibuprofen (MOTRIN) 800 mg tablet Take 1 Tab by mouth every six (6) hours as needed for Pain. (Patient taking differently: Take 600 mg by mouth every six (6) hours as needed for Pain.)    simvastatin (ZOCOR) 10 mg tablet 10 mg nightly. cholecalciferol, vitamin D3, 50 mcg (2,000 unit) tab Take 1,500 Units by mouth daily. No current facility-administered medications for this visit. Review of Symptoms:  11 systems reviewed, negative other than as stated in the HPI    Physical ExamPhysical Exam:    There were no vitals filed for this visit. There is no height or weight on file to calculate BMI. General PE  Gen:  NAD  Mental Status - Alert. General Appearance - Not in acute distress. HEENT:  PERRL, no carotid bruits or JVD  Chest and Lung Exam   Inspection: Accessory muscles - No use of accessory muscles in breathing. Auscultation:   Breath sounds: - Normal.   Cardiovascular   Inspection: Jugular vein - Bilateral - Inspection Normal.   Palpation/Percussion:   Apical Impulse: - Normal.   Auscultation: Rhythm - Regular. Heart Sounds - S1 WNL and S2 WNL. No S3 or S4. Murmurs & Other Heart Sounds: Auscultation of the heart reveals - No Murmurs. Peripheral Vascular   Upper Extremity: Inspection - Bilateral - No Cyanotic nailbeds or Digital clubbing. Lower Extremity:   Palpation: Edema - Bilateral - No edema. Abdomen:   Soft, non-tender, bowel sounds are active.   Neuro: A&O times 3, CN and motor grossly WNL    Labs:   No results found for: CHOL, CHOLX, CHLST, CHOLV, 554024, HDL, HDLP, LDL, LDLC, DLDLP, TGLX, TRIGL, TRIGP, CHHD, CHHDX  No results found for: CPK, CPKX, CPX  Lab Results Component Value Date/Time    Sodium 142 07/15/2022 08:23 AM    Potassium 4.1 07/15/2022 08:23 AM    Chloride 109 (H) 07/15/2022 08:23 AM    CO2 28 07/15/2022 08:23 AM    Anion gap 5 07/15/2022 08:23 AM    Glucose 96 07/15/2022 08:23 AM    BUN 15 07/15/2022 08:23 AM    Creatinine 1.12 07/15/2022 08:23 AM    BUN/Creatinine ratio 13 07/15/2022 08:23 AM    GFR est AA >60 07/15/2022 08:23 AM    GFR est non-AA >60 07/15/2022 08:23 AM    Calcium 9.2 07/15/2022 08:23 AM    Bilirubin, total 0.8 07/15/2022 08:23 AM    Alk. phosphatase 69 07/15/2022 08:23 AM    Protein, total 7.6 07/15/2022 08:23 AM    Albumin 3.7 07/15/2022 08:23 AM    Globulin 3.9 07/15/2022 08:23 AM    A-G Ratio 0.9 (L) 07/15/2022 08:23 AM    ALT (SGPT) 29 07/15/2022 08:23 AM       04/21/22    ECHO STRESS 04/21/2022 4/25/2022    Interpretation Summary    Stress Test: A Jose protocol stress test was performed. The patient exercised for 3 min and 1 sec. Stress ECG: Arrhythmias during stress: SVT at up to 190 BPM, starting 45 seconds into stage 1. Resolved spontaneously. ECG: Stress ECG was negative for ischemia. Left Ventricle: Normal left ventricular systolic function. Left ventricle size is normal. Normal wall motion. Post-stress Echo: The post-stress echo showed normal wall motion which was improved compared to baseline. Study Impression: The study is negative for echocardiographic evidence of ischemia, but induced rapid SVT at a low-level of exercise. Signed by: Naida Hernández MD on 4/21/2022  2:30 PM    CT Results (most recent):  Results from [place] encounter on 05/04/22    CT HEART W/O CONT WITH CALCIUM    Narrative  EXAM: CT HEART W/O CONT WITH CALCIUM    INDICATION: Cardiovascular risk. Pure hypercholesterolemia, unspecified    COMPARISON: None. TECHNIQUE: Unenhanced multislice helical CT of the heart was performed on a  64-slice multiple detector row CT system ([employer]).  Quantitative coronary artery  CT calcium scoring was performed using [employer] software. No intravenous contrast was  administered. CT dose reduction was achieved through use of a standardized  protocol tailored for this examination and automatic exposure control for dose  modulation. FINDINGS:  The coronary calcium in each vessel is as follows:    Left main coronary artery: 0  Left anterior descending coronary artery: 0  Left circumflex coronary artery: 0  Right coronary artery: 0  Posterior descending coronary artery: 0    Total calcium score: 0    Calcium score interpretation:  0-0 = No evidence of CAD  1-10 = Minimal evidence of CAD   = Mild evidence of CAD  101-400 = Moderate evidence of CAD  >400 = Extensive evidence of CAD    A score of 0 is at the 0 percentile rank. Therefore, 100% of the males aged  46-50 will have a higher calcium score. Chest CT findings:  No significant abnormality in the incidentally imaged lower  lungs. 2 mm right middle lobe lung nodule (301:37). The entire lung fields are  not imaged on this examination. No evidence of mass or lymphadenopathy. The  incidentally imaged portions of the upper abdominal organs are within normal  limits on this unenhanced examination. The bones are within normal limits. Impression  Total calcium score of 0. No evidence of calcified plaque. This is a \"negative\"  examination. There is a greater than 97 % chance for absence of coronary artery  disease. The result should be discussed with the patient taking into account  other risk factors such as age, gender, family history, diabetes, smoking or  high cholesterol levels. Assessment:          ICD-10-CM ICD-9-CM    1. PSVT (paroxysmal supraventricular tachycardia) ([facility])  I47.1 427.0       2. SVT (supraventricular tachycardia) ([facility])  I47.1 427.89       3. Agatston CAC score, <100  R93.1 793.2       4. Mixed hyperlipidemia  E78.2 272.2       5. Precordial chest pain  R07.2 786.51       6.  Gastroesophageal reflux disease, unspecified whether esophagitis present  K21.9 530.81           No orders of the defined types were placed in this encounter. 07/08/22    ECHO ADULT COMPLETE 07/09/2022 7/9/2022    Interpretation Summary  Formatting of this result is different from the original.      Left Ventricle: Normal left ventricular systolic function with a visually estimated EF of 55 - 60%. Left ventricle size is normal. Normal wall thickness. Normal wall motion. Normal diastolic function. Signed by: Azeem San MD on 7/9/2022  4:21 PM       Plan:     Precordial chest pain, with stress echo revealing SVT at a low level of exercise while off of propranolol:  LVEF 55-60% valves ok per echo in 7/9/2022  Event monitor 7/2022: event monitor was normal with no evidence of supraventricular tachycardia or other arrhythmias. Rare early heartbeats which we all have  Coronary calcium score of 0 is comforting, with a very high negative predictive value. Discussed with patient. Stress echo 4/2022 was normal  Lytes ok 7/2022   Continue BB---Toprol XL  Will call us if any recurrent palpitation  Recall  propranolol caused breakthrough SVT/palpitation about 3 times a week      Mixed hyperlipidemia:  Lipids followed by PCP. On low-dose simvastatin. Relatively low priority to treat aggressively with coronary calcium score of 0. Migraine  On Emgality pen, followed by Dr. Roney Garcia     S/p Laparoscopic cholecystectomy performed by Dr Donna Bonds in 7/15/2022      Continue current care and follow up in  April 2023 as scheduled    Colleen Figueroa NP    Patient seen and examined by me with the above nurse practitioner. I personally performed all components of the history, physical, and medical decision making and agree with the assessment and plan with minor modifications as noted. Today the patient presents with resolution of palpitations and chest discomfort since starting on metoprolol.   Comforted by the finding of a coronary calcium score of 0.    General PE  Gen:  NAD  Mental Status - Alert. General Appearance - Not in acute distress. HEENT:  PERRL, no carotid bruits or JVD  Chest and Lung Exam   Inspection: Accessory muscles - No use of accessory muscles in breathing. Auscultation:   Breath sounds: - Normal.   Cardiovascular   Inspection: Jugular vein - Bilateral - Inspection Normal.   Palpation/Percussion:   Apical Impulse: - Normal.   Auscultation: Rhythm - Regular. Heart Sounds - S1 WNL and S2 WNL. No S3 or S4. Murmurs & Other Heart Sounds: Auscultation of the heart reveals - No Murmurs. Peripheral Vascular   Upper Extremity: Inspection - Bilateral - No Cyanotic nailbeds or Digital clubbing. Lower Extremity:   Palpation: Edema - Bilateral - No edema. Abdomen:   Soft, non-tender, bowel sounds are active. Neuro: A&O times 3, CN and motor grossly WNL    Continue current cardiac care. SVT is well controlled with beta-blocker. He does have an appointment with electrophysiology and he will decide whether he wants to keep that just to discuss the possibility of eventual ablation or cancel and keep that as a possibility in the future. Follow up in as scheduled, he thinks it is in April.

## 2022-09-13 NOTE — LETTER
9/13/2022    Patient: Rangel Smyth   YOB: 1970   Date of Visit: 9/13/2022     Johnathan Nyhan, MD  [address] [address]  Via Fax: [phone]    Dear Johnathan Nyhan, MD,      Thank you for referring Mr. Ilir Sandoval to [address] [place] [address] for evaluation. My notes for this consultation are attached. If you have questions, please do not hesitate to call me. I look forward to following your patient along with you.       Sincerely,    Anaid Elena MD

## 2022-11-28 RX ORDER — METOPROLOL SUCCINATE 50 MG/1
50 TABLET, EXTENDED RELEASE ORAL
Qty: 90 TABLET | Refills: 1 | Status: SHIPPED | OUTPATIENT
Start: 2022-11-28

## 2022-11-28 NOTE — TELEPHONE ENCOUNTER
PCP: Herbert Dowd MD    Last appt: 9/13/2022  Future Appointments   Date Time Provider Jaden Wright   1/9/2023  8:40 AM MD SCHUYLER Tobin BS AMB   4/17/2023  9:40 AM Melina Bianchi MD CAVREY BS AMB       Requested Prescriptions     Signed Prescriptions Disp Refills    metoprolol succinate (TOPROL-XL) 50 mg XL tablet 90 Tablet 1     Sig: Take 1 Tablet by mouth nightly. Authorizing Provider: Maci Thomson     Ordering User: Doron ROSALES         Other Comments:  Verbal order per provider. Order (medication, dose, route, frequency, amount, refills) repeated and verified twice.

## 2023-01-09 ENCOUNTER — OFFICE VISIT (OUTPATIENT)
Dept: NEUROLOGY | Age: 53
End: 2023-01-09
Payer: COMMERCIAL

## 2023-01-09 VITALS
BODY MASS INDEX: 28.75 KG/M2 | DIASTOLIC BLOOD PRESSURE: 80 MMHG | SYSTOLIC BLOOD PRESSURE: 106 MMHG | TEMPERATURE: 98 F | HEART RATE: 68 BPM | OXYGEN SATURATION: 100 % | RESPIRATION RATE: 18 BRPM | HEIGHT: 70 IN | WEIGHT: 200.8 LBS

## 2023-01-09 DIAGNOSIS — I67.89 CEREBRAL MICROVASCULAR DISEASE: Primary | ICD-10-CM

## 2023-01-09 DIAGNOSIS — M96.1 CERVICAL POST-LAMINECTOMY SYNDROME: ICD-10-CM

## 2023-01-09 DIAGNOSIS — M96.1 LUMBAR POST-LAMINECTOMY SYNDROME: ICD-10-CM

## 2023-01-09 DIAGNOSIS — G44.209 MIXED COMMON MIGRAINE AND MUSCLE CONTRACTION HEADACHE: ICD-10-CM

## 2023-01-09 DIAGNOSIS — G43.009 MIXED COMMON MIGRAINE AND MUSCLE CONTRACTION HEADACHE: ICD-10-CM

## 2023-01-09 DIAGNOSIS — G44.86 CERVICOGENIC HEADACHE: ICD-10-CM

## 2023-01-09 DIAGNOSIS — G43.109 MIGRAINE WITH AURA AND WITHOUT STATUS MIGRAINOSUS, NOT INTRACTABLE: ICD-10-CM

## 2023-01-09 PROCEDURE — 99214 OFFICE O/P EST MOD 30 MIN: CPT | Performed by: PSYCHIATRY & NEUROLOGY

## 2023-01-09 RX ORDER — IBUPROFEN 600 MG/1
600 TABLET ORAL
COMMUNITY

## 2023-01-09 RX ORDER — AMITRIPTYLINE HYDROCHLORIDE 25 MG/1
25 TABLET, FILM COATED ORAL
Qty: 100 TABLET | Refills: 11 | Status: SHIPPED | OUTPATIENT
Start: 2023-01-09

## 2023-01-09 NOTE — PROGRESS NOTES
Chief Complaint   Patient presents with    Headache     1. Have you been to the ER, urgent care clinic since your last visit? Hospitalized since your last visit? Yes ED [facility] for chest pain - GERD    2. Have you seen or consulted any other health care providers outside of the [address] [place] [address] since your last visit? Include any pap smears or colon screening.   No

## 2023-01-09 NOTE — LETTER
1/9/2023    Patient: Delta Alvarez   YOB: 1970   Date of Visit: 1/9/2023     Gregorio Galeano MD  [address] [place] [id number]  Via Fax: [phone]    Dear Gregorio Galeano MD,      Thank you for referring Mr. Jerson Krishnan to [address] [place] [address] for evaluation. My notes for this consultation are attached. Chief Complaint   Patient presents with    Headache     1. Have you been to the ER, urgent care clinic since your last visit? Hospitalized since your last visit? Yes ED [facility] for chest pain - GERD    2. Have you seen or consulted any other health care providers outside of the [address] [place] [address] since your last visit? Include any pap smears or colon screening. No       Consult  REFERRED BY:  Nae Asecnio MD    CHIEF COMPLAINT: Progressive headaches since early summer last year      Subjective:     Delta Alvarez is a 46 y.o. male seen as a new patient to me, at the request of Dr. Shahriar Tyson for evaluation of new problem of progressive and severe headaches that are persistent despite taking Emgality for 2 years, which initially seemed to help some but now he is still getting frequent headaches, he is averaging about 7-10 headaches a month on that medication 120 mg once a month, taking Maxalt as needed for the headaches which usually seems to help some, and he had to discontinue the Trokendi and Topamax because it was causing side effects of nausea and feeling better, he is already on metoprolol for PSVT and has tried other medications but he cannot remember all the names, so we will try him on amitriptyline 25 mg at night each night to see if that will help him sleep because he did not sleep well and has a lot of chronic neck pain still and the headaches are mainly on the left side throbbing in character associated with nausea and sometimes incapacitation.   He had an MRI of the brain in 2019 and showed just minimal white matter disease, otherwise negative had no repeat imaging since then. He had a previous cervical laminectomy with MRI of the cervical spine showing residual moderate degenerative changes still and also has had a MRI of the lumbar spine showing postop changes there from his previous lumbar laminectomy and has moderate degenerative changes there also. He also uses magnesium 400 mg a day which can work as a headache preventative, and also takes vitamin D 2000 units a day which may help some also. He takes fish oil and super B complex and multivitamin every day and takes Zocor 10 mg a day for his cholesterol, and Cymbalta already 30 mg a day which may also help with his headaches. He looks a little stressed and chronically depressed. He has had no fever, no meningismus, no trauma, but does have a lot of stress. He has had 3 cervical fusions and cervical laminectomies in the past a lot of his headaches begin in the neck region, I suggested he do a range of motion to his neck to help keep the muscles loose, and he probably has cervicogenic headaches. His last fusion was in 2016. Past Medical History:   Diagnosis Date    Back pain     GERD (gastroesophageal reflux disease)     Hypercholesteremia     Migraine 2009    Neck pain     PONV (postoperative nausea and vomiting)     Thyroid disease     nodule      Past Surgical History:   Procedure Laterality Date    HX APPENDECTOMY  1990    HX CERVICAL LAMINECTOMY      HX CERVICAL LAMINECTOMY  2016    HX CHOLECYSTECTOMY      HX LAP CHOLECYSTECTOMY  07/15/2022    lap radha with Intraoperative cholangiogram with intraoperative interpritation.     HX LAP CHOLECYSTECTOMY  07/15/2022    Dr. Joshua Moses    HX ORTHOPAEDIC      Neck and lumbar back surgeries: 2008, 2014, 2016    HX SHOULDER ARTHROSCOPY Left 2021    HX TUMOR REMOVAL  2003    Fatty tumors    HX VASECTOMY       Family History   Problem Relation Age of Onset    Cancer Mother         uterine    Other Father         ALS  Stroke Maternal Grandfather     Cancer Maternal Grandfather     Dementia Paternal Grandmother     Dementia Paternal Grandfather     Cancer Maternal Grandmother         breast      Social History     Tobacco Use    Smoking status: Never    Smokeless tobacco: Never   Substance Use Topics    Alcohol use: Not Currently         Current Outpatient Medications:     ibuprofen (MOTRIN) 600 mg tablet, Take 600 mg by mouth every six (6) hours as needed for Pain., Disp: , Rfl:     amitriptyline (ELAVIL) 25 mg tablet, Take 1 Tablet by mouth nightly., Disp: 100 Tablet, Rfl: 11    metoprolol succinate (TOPROL-XL) 50 mg XL tablet, Take 1 Tablet by mouth nightly., Disp: 90 Tablet, Rfl: 1    multivitamin (ONE A DAY) tablet, Take 1 Tablet by mouth daily. , Disp: , Rfl:     cyanocobalamin/cobamamide (B12 SL), 2,500 Units by SubLINGual route daily. , Disp: , Rfl:     Emgality Pen 120 mg/mL injection, INJECT 1 MILLILITER SUBCUTANEOUSLY EVERY 30 DAYS (Patient taking differently: 120 mg by SubCUTAneous route every thirty (30) days.), Disp: 1 mL, Rfl: 11    calcium carbonate (TUMS PO), Take  by mouth nightly as needed. , Disp: , Rfl:     simethicone (GAS-X) 125 mg capsule, Take 125 mg by mouth four (4) times daily as needed for Flatulence. , Disp: , Rfl:     rizatriptan (MAXALT) 10 mg tablet, Take 1 Tablet by mouth every eight (8) hours as needed for Migraine. May repeat in 2 hours if needed, Disp: 12 Tablet, Rfl: 11    simvastatin (ZOCOR) 10 mg tablet, 10 mg nightly., Disp: , Rfl:     cholecalciferol, vitamin D3, 50 mcg (2,000 unit) tab, Take 2,000 Units by mouth daily. , Disp: , Rfl:         Allergies   Allergen Reactions    Contrast Dye [Iodine] Hives    Lyrica [Pregabalin] Other (comments)     Behavior change      MRI Results (most recent):  Results from Hospital Encounter encounter on 12/17/19    MRI BRAIN W WO CONT    Narrative  EXAM:  MRI BRAIN W WO CONT    INDICATION:    Chronic daily headache    COMPARISON: None.    CONTRAST: 18 ml Dotarem. TECHNIQUE:  Multiplanar multisequence acquisition without and with contrast of the brain. FINDINGS:  The ventricles are normal in size and position. Minimal (less than 5) scattered  nonspecific T2/FLAIR white matter hyperintensities, of doubtful clinical  significance. The brain parenchyma otherwise has normal signal characteristics. There is no acute infarct, hemorrhage, extra-axial fluid collection, or mass  effect. There is no cerebellar tonsillar herniation. Expected arterial  flow-voids are present. No evidence of abnormal enhancement. Small retention cyst in the right maxillary sinus. The remaining paranasal  sinuses, mastoid air cells and middle ears are clear. The orbital contents are  within normal limits. No significant osseous or scalp lesions are identified. Impression  IMPRESSION:    1. No acute or significant intracranial abnormality. Results from [place] encounter on 12/17/19    MRI BRAIN W WO CONT    Narrative  EXAM:  MRI BRAIN W WO CONT    INDICATION:    Chronic daily headache    COMPARISON:  None. CONTRAST: 18 ml Dotarem. TECHNIQUE:  Multiplanar multisequence acquisition without and with contrast of the brain. FINDINGS:  The ventricles are normal in size and position. Minimal (less than 5) scattered  nonspecific T2/FLAIR white matter hyperintensities, of doubtful clinical  significance. The brain parenchyma otherwise has normal signal characteristics. There is no acute infarct, hemorrhage, extra-axial fluid collection, or mass  effect. There is no cerebellar tonsillar herniation. Expected arterial  flow-voids are present. No evidence of abnormal enhancement. Small retention cyst in the right maxillary sinus. The remaining paranasal  sinuses, mastoid air cells and middle ears are clear. The orbital contents are  within normal limits. No significant osseous or scalp lesions are identified. Impression  IMPRESSION:    1.  No acute or significant intracranial abnormality. Review of Systems:  A comprehensive review of systems was negative except for: Musculoskeletal: positive for myalgias, arthralgias, stiff joints and neck pain  Neurological: positive for headaches  Behvioral/Psych: positive for anxiety and depression   Vitals:    01/09/23 0842   BP: 106/80   Pulse: 68   Resp: 18   Temp: 98 °F (36.7 °C)   TempSrc: Temporal   SpO2: 100%   Weight: 200 lb 12.8 oz (91.1 kg)   Height: 5' 10\" (1.778 m)     Objective:     I      NEUROLOGICAL EXAM:    Appearance: The patient is well developed, well nourished, provides a coherent history and is in no acute distress. Patient has a well-healed anterior cervical laminectomy scar   Mental Status: Oriented to time, place and person, and the president, cognitive function is normal and speech is fluent and no aphasia or dysarthria. Mood and affect appropriate but appears mildly depressed. Cranial Nerves:   Intact visual fields. Fundi are benign, disc are flat, no lesions seen on funduscopy. DANNI, EOM's full, no nystagmus, no ptosis. Facial sensation is normal. Corneal reflexes are not tested. Facial movement is symmetric. Hearing is normal bilaterally. Palate is midline with normal sternocleidomastoid and trapezius muscles are normal. Tongue is midline. Neck without meningismus or bruits, but does show very mild limitation of movement from his neck fusion  Temporal arteries are not tender or enlarged  TMJ areas are not tender on palpation   Motor:  5/5 strength in upper and lower proximal and distal muscles. Normal bulk and tone. No fasciculations. Rapid alternating movement is symmetric and intact bilaterally   Reflexes:   Deep tendon reflexes 2+/4 and symmetrical.  No babinski or clonus present   Sensory:   Normal to touch, pinprick and vibration and temperature. DSS is intact   Gait:  Normal gait for patient's age. Tremor:   No tremor noted.    Cerebellar:  No abnormal cerebellar signs present on Romberg and tandem testing and finger-nose-finger exam.   Neurovascular:  Normal heart sounds and regular rhythm, peripheral pulses are decreased, and no carotid bruits. Assessment:       ICD-10-CM ICD-9-CM    1. Cerebral microvascular disease  I67.89 437.8 amitriptyline (ELAVIL) 25 mg tablet      2. 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 Cervicogenic headache  G44.86 784.0 amitriptyline (ELAVIL) 25 mg tablet      4. Cervical post-laminectomy syndrome  M96.1 722.81 amitriptyline (ELAVIL) 25 mg tablet      5. Lumbar post-laminectomy syndrome  M96.1 722.83       6. Migraine with aura and without status migrainosus, not intractable  G43.109 346.00         Active Problems:    * No active hospital problems. *      Plan:     Patient's records, and MRI scans of the brain and cervical spine all personally reviewed on the PACS system myself, and with the patient, and we went over these in detail with the patient, and I explained that most likely the white matter lesions in the brain secondary to his headaches, and that his cervical spine actually looks fairly good now adequately decompressed. Patient with vascular migraine type headaches and cervicogenic headaches and probable muscle tension headaches, he could not tolerate Topamax or Trokendi again, is on a beta-blocker already, and the CGRP inhibitor of Emgality and still having bad headaches and frequent headaches, we will try him on amitriptyline, and if he fails that, he may be a candidate for acute left or one of the other CGRP preventive therapies. He may be a candidate for one of the oral CGRP episodic treatments like Ubrelvy.   We discussed Botox and that could be another therapeutic option down the way we will try to get through amitriptyline first.  Patient is exam is relatively unremarkable except for his neck problem, and he had an MRI scan done in August 2019 that showed a mild C5-6 joint hypertrophy possibly affecting the right C6 nerve root with a fusion from C4-C7 but no real cord signal abnormality. Again his MRI of the brain in December 2019 just showed the minimal white matter disease he will get a Doppler for that. He is also encouraged take a baby aspirin every day which may help prevent some of the headaches and prevent any further white matter disease. We will check him again in 6 months time or earlier if need be. He will call if any problem in the interim  He is also encouraged remain mentally and physically active, and continue his magnesium and regular exercise program to his neck. Signed By: Patrick Lang MD     January 9, 2023       CC: Royal Haji MD  FAX: [phone]        If you have questions, please do not hesitate to call me. I look forward to following your patient along with you.       Sincerely,    Patrick Lang MD

## 2023-01-09 NOTE — PROGRESS NOTES
Consult  REFERRED BY:  Amanda River MD    CHIEF COMPLAINT: Progressive headaches since early summer last year      Subjective:     Amee Farmer is a 46 y.o. male seen as a new patient to me, at the request of Dr. Jaz Olivo for evaluation of new problem of progressive and severe headaches that are persistent despite taking Emgality for 2 years, which initially seemed to help some but now he is still getting frequent headaches, he is averaging about 7-10 headaches a month on that medication 120 mg once a month, taking Maxalt as needed for the headaches which usually seems to help some, and he had to discontinue the Trokendi and Topamax because it was causing side effects of nausea and feeling better, he is already on metoprolol for PSVT and has tried other medications but he cannot remember all the names, so we will try him on amitriptyline 25 mg at night each night to see if that will help him sleep because he did not sleep well and has a lot of chronic neck pain still and the headaches are mainly on the left side throbbing in character associated with nausea and sometimes incapacitation. He had an MRI of the brain in 2019 and showed just minimal white matter disease, otherwise negative had no repeat imaging since then. He had a previous cervical laminectomy with MRI of the cervical spine showing residual moderate degenerative changes still and also has had a MRI of the lumbar spine showing postop changes there from his previous lumbar laminectomy and has moderate degenerative changes there also. He also uses magnesium 400 mg a day which can work as a headache preventative, and also takes vitamin D 2000 units a day which may help some also. He takes fish oil and super B complex and multivitamin every day and takes Zocor 10 mg a day for his cholesterol, and Cymbalta already 30 mg a day which may also help with his headaches. He looks a little stressed and chronically depressed.   He has had no fever, no meningismus, no trauma, but does have a lot of stress. He has had 3 cervical fusions and cervical laminectomies in the past a lot of his headaches begin in the neck region, I suggested he do a range of motion to his neck to help keep the muscles loose, and he probably has cervicogenic headaches. His last fusion was in 2016. Past Medical History:   Diagnosis Date    Back pain     GERD (gastroesophageal reflux disease)     Hypercholesteremia     Migraine 2009    Neck pain     PONV (postoperative nausea and vomiting)     Thyroid disease     nodule      Past Surgical History:   Procedure Laterality Date    HX APPENDECTOMY  1990    HX CERVICAL LAMINECTOMY      HX CERVICAL LAMINECTOMY  2016    HX CHOLECYSTECTOMY      HX LAP CHOLECYSTECTOMY  07/15/2022    lap radha with Intraoperative cholangiogram with intraoperative interpritation. HX LAP CHOLECYSTECTOMY  07/15/2022    Dr. Aby Quarles [facility]      Neck and lumbar back surgeries: 2008, 2014, 2016    HX SHOULDER ARTHROSCOPY Left 2021    HX TUMOR REMOVAL  2003    Fatty tumors    HX VASECTOMY       Family History   Problem Relation Age of Onset    Cancer Mother         uterine    Other Father         ALS    Stroke Maternal Grandfather     Cancer Maternal Grandfather     Dementia Paternal Grandmother     Dementia Paternal Grandfather     Cancer Maternal Grandmother         breast      Social History     Tobacco Use    Smoking status: Never    Smokeless tobacco: Never   Substance Use Topics    Alcohol use: Not Currently         Current Outpatient Medications:     ibuprofen (MOTRIN) 600 mg tablet, Take 600 mg by mouth every six (6) hours as needed for Pain., Disp: , Rfl:     amitriptyline (ELAVIL) 25 mg tablet, Take 1 Tablet by mouth nightly., Disp: 100 Tablet, Rfl: 11    metoprolol succinate (TOPROL-XL) 50 mg XL tablet, Take 1 Tablet by mouth nightly., Disp: 90 Tablet, Rfl: 1    multivitamin (ONE A DAY) tablet, Take 1 Tablet by mouth daily. , Disp: , Rfl: cyanocobalamin/cobamamide (B12 SL), 2,500 Units by SubLINGual route daily. , Disp: , Rfl:     Emgality Pen 120 mg/mL injection, INJECT 1 MILLILITER SUBCUTANEOUSLY EVERY 30 DAYS (Patient taking differently: 120 mg by SubCUTAneous route every thirty (30) days.), Disp: 1 mL, Rfl: 11    calcium carbonate (TUMS PO), Take  by mouth nightly as needed. , Disp: , Rfl:     simethicone (GAS-X) 125 mg capsule, Take 125 mg by mouth four (4) times daily as needed for Flatulence. , Disp: , Rfl:     rizatriptan (MAXALT) 10 mg tablet, Take 1 Tablet by mouth every eight (8) hours as needed for Migraine. May repeat in 2 hours if needed, Disp: 12 Tablet, Rfl: 11    simvastatin (ZOCOR) 10 mg tablet, 10 mg nightly., Disp: , Rfl:     cholecalciferol, vitamin D3, 50 mcg (2,000 unit) tab, Take 2,000 Units by mouth daily. , Disp: , Rfl:         Allergies   Allergen Reactions    Contrast Dye [Iodine] Hives    Lyrica [Pregabalin] Other (comments)     Behavior change      MRI Results (most recent):  Results from Hospital Encounter encounter on 12/17/19    MRI BRAIN W WO CONT    Narrative  EXAM:  MRI BRAIN W WO CONT    INDICATION:    Chronic daily headache    COMPARISON:  None. CONTRAST: 18 ml Dotarem. TECHNIQUE:  Multiplanar multisequence acquisition without and with contrast of the brain. FINDINGS:  The ventricles are normal in size and position. Minimal (less than 5) scattered  nonspecific T2/FLAIR white matter hyperintensities, of doubtful clinical  significance. The brain parenchyma otherwise has normal signal characteristics. There is no acute infarct, hemorrhage, extra-axial fluid collection, or mass  effect. There is no cerebellar tonsillar herniation. Expected arterial  flow-voids are present. No evidence of abnormal enhancement. Small retention cyst in the right maxillary sinus. The remaining paranasal  sinuses, mastoid air cells and middle ears are clear. The orbital contents are  within normal limits.  No significant osseous or scalp lesions are identified. Impression  IMPRESSION:    1. No acute or significant intracranial abnormality. Results from [place] encounter on 12/17/19    MRI BRAIN W WO CONT    Narrative  EXAM:  MRI BRAIN W WO CONT    INDICATION:    Chronic daily headache    COMPARISON:  None. CONTRAST: 18 ml Dotarem. TECHNIQUE:  Multiplanar multisequence acquisition without and with contrast of the brain. FINDINGS:  The ventricles are normal in size and position. Minimal (less than 5) scattered  nonspecific T2/FLAIR white matter hyperintensities, of doubtful clinical  significance. The brain parenchyma otherwise has normal signal characteristics. There is no acute infarct, hemorrhage, extra-axial fluid collection, or mass  effect. There is no cerebellar tonsillar herniation. Expected arterial  flow-voids are present. No evidence of abnormal enhancement. Small retention cyst in the right maxillary sinus. The remaining paranasal  sinuses, mastoid air cells and middle ears are clear. The orbital contents are  within normal limits. No significant osseous or scalp lesions are identified. Impression  IMPRESSION:    1. No acute or significant intracranial abnormality. Review of Systems:  A comprehensive review of systems was negative except for: Musculoskeletal: positive for myalgias, arthralgias, stiff joints and neck pain  Neurological: positive for headaches  Behvioral/Psych: positive for anxiety and depression   Vitals:    01/09/23 0842   BP: 106/80   Pulse: 68   Resp: 18   Temp: 98 °F (36.7 °C)   TempSrc: Temporal   SpO2: 100%   Weight: 200 lb 12.8 oz (91.1 kg)   Height: 5' 10\" (1.778 m)     Objective:     I      NEUROLOGICAL EXAM:    Appearance: The patient is well developed, well nourished, provides a coherent history and is in no acute distress.   Patient has a well-healed anterior cervical laminectomy scar   Mental Status: Oriented to time, place and person, and the president, cognitive function is normal and speech is fluent and no aphasia or dysarthria. Mood and affect appropriate but appears mildly depressed. Cranial Nerves:   Intact visual fields. Fundi are benign, disc are flat, no lesions seen on funduscopy. DANNI, EOM's full, no nystagmus, no ptosis. Facial sensation is normal. Corneal reflexes are not tested. Facial movement is symmetric. Hearing is normal bilaterally. Palate is midline with normal sternocleidomastoid and trapezius muscles are normal. Tongue is midline. Neck without meningismus or bruits, but does show very mild limitation of movement from his neck fusion  Temporal arteries are not tender or enlarged  TMJ areas are not tender on palpation   Motor:  5/5 strength in upper and lower proximal and distal muscles. Normal bulk and tone. No fasciculations. Rapid alternating movement is symmetric and intact bilaterally   Reflexes:   Deep tendon reflexes 2+/4 and symmetrical.  No babinski or clonus present   Sensory:   Normal to touch, pinprick and vibration and temperature. DSS is intact   Gait:  Normal gait for patient's age. Tremor:   No tremor noted. Cerebellar:  No abnormal cerebellar signs present on Romberg and tandem testing and finger-nose-finger exam.   Neurovascular:  Normal heart sounds and regular rhythm, peripheral pulses are decreased, and no carotid bruits. Assessment:       ICD-10-CM ICD-9-CM    1. Cerebral microvascular disease  I67.89 437.8 amitriptyline (ELAVIL) 25 mg tablet      2. 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 Cervicogenic headache  G44.86 784.0 amitriptyline (ELAVIL) 25 mg tablet      4. Cervical post-laminectomy syndrome  M96.1 722.81 amitriptyline (ELAVIL) 25 mg tablet      5. Lumbar post-laminectomy syndrome  M96.1 722.83       6.  Migraine with aura and without status migrainosus, not intractable  G43.109 346.00         Active Problems: * No active hospital problems. *      Plan:     Patient's records, and MRI scans of the brain and cervical spine all personally reviewed on the PACS system myself, and with the patient, and we went over these in detail with the patient, and I explained that most likely the white matter lesions in the brain secondary to his headaches, and that his cervical spine actually looks fairly good now adequately decompressed. Patient with vascular migraine type headaches and cervicogenic headaches and probable muscle tension headaches, he could not tolerate Topamax or Trokendi again, is on a beta-blocker already, and the CGRP inhibitor of Emgality and still having bad headaches and frequent headaches, we will try him on amitriptyline, and if he fails that, he may be a candidate for acute left or one of the other CGRP preventive therapies. He may be a candidate for one of the oral CGRP episodic treatments like Ubrelvy. We discussed Botox and that could be another therapeutic option down the way we will try to get through amitriptyline first.  Patient is exam is relatively unremarkable except for his neck problem, and he had an MRI scan done in August 2019 that showed a mild C5-6 joint hypertrophy possibly affecting the right C6 nerve root with a fusion from C4-C7 but no real cord signal abnormality. Again his MRI of the brain in December 2019 just showed the minimal white matter disease he will get a Doppler for that. He is also encouraged take a baby aspirin every day which may help prevent some of the headaches and prevent any further white matter disease. We will check him again in 6 months time or earlier if need be. He will call if any problem in the interim  He is also encouraged remain mentally and physically active, and continue his magnesium and regular exercise program to his neck.     Signed By: Phoenix Ramírez MD     January 9, 2023       CC: Clifton Duque MD  FAX: [phone]

## 2023-03-17 DIAGNOSIS — G43.719 INTRACTABLE CHRONIC MIGRAINE WITHOUT AURA AND WITHOUT STATUS MIGRAINOSUS: ICD-10-CM

## 2023-03-17 RX ORDER — GALCANEZUMAB 120 MG/ML
INJECTION, SOLUTION SUBCUTANEOUS
Qty: 1 ML | Refills: 11 | Status: SHIPPED | OUTPATIENT
Start: 2023-03-17

## 2023-04-20 ENCOUNTER — TELEPHONE (OUTPATIENT)
Dept: CARDIOLOGY CLINIC | Age: 53
End: 2023-04-20

## 2023-05-18 DIAGNOSIS — M96.1 POSTLAMINECTOMY SYNDROME, NOT ELSEWHERE CLASSIFIED: ICD-10-CM

## 2023-05-18 DIAGNOSIS — G44.86 CERVICOGENIC HEADACHE: ICD-10-CM

## 2023-05-19 ENCOUNTER — TELEPHONE (OUTPATIENT)
Facility: HOSPITAL | Age: 53
End: 2023-05-19

## 2023-05-19 RX ORDER — RIZATRIPTAN BENZOATE 10 MG/1
TABLET ORAL
Qty: 12 TABLET | Refills: 11 | Status: SHIPPED | OUTPATIENT
Start: 2023-05-19

## 2023-05-19 NOTE — TELEPHONE ENCOUNTER
Please advise monitor showed occasional early heartbeats from the upper chambers of the heart, for total of about 4% of all heartbeats. This is not dangerous. No atrial fibrillation or supraventricular tachycardia. Continue magnesium and metoprolol. No additional treatment is needed at this time. Follow-up in 1 year if doing well. Of course if he does have rapid heartbeat greater than 120 bpm at rest that does not go away it could be a recurrence of the supraventricular tachycardia at which point he could get an EKG and/or see us sooner.

## 2023-05-27 DIAGNOSIS — I47.1 SUPRAVENTRICULAR TACHYCARDIA (HCC): Primary | ICD-10-CM

## 2023-05-31 RX ORDER — METOPROLOL SUCCINATE 50 MG/1
TABLET, EXTENDED RELEASE ORAL NIGHTLY
Qty: 90 TABLET | Refills: 3 | Status: SHIPPED | OUTPATIENT
Start: 2023-05-31

## 2023-05-31 NOTE — TELEPHONE ENCOUNTER
Per VO by MD.    Future Appointments   Date Time Provider Hina Aceves   6/22/2023  3:00 PM An MD RICKIE Sullivan BS AMB   7/10/2023  8:00 AM NOMI Tabares BS AMB   4/29/2024 10:20 AM MD RICKIE Burton BS AMB

## 2023-06-21 PROBLEM — I47.10 SVT (SUPRAVENTRICULAR TACHYCARDIA): Status: ACTIVE | Noted: 2023-06-21

## 2023-06-21 PROBLEM — I47.1 SVT (SUPRAVENTRICULAR TACHYCARDIA) (HCC): Status: ACTIVE | Noted: 2023-06-21

## 2023-06-22 ENCOUNTER — OFFICE VISIT (OUTPATIENT)
Age: 53
End: 2023-06-22
Payer: COMMERCIAL

## 2023-06-22 VITALS
BODY MASS INDEX: 26.92 KG/M2 | RESPIRATION RATE: 20 BRPM | WEIGHT: 188 LBS | HEIGHT: 70 IN | OXYGEN SATURATION: 100 % | HEART RATE: 70 BPM | DIASTOLIC BLOOD PRESSURE: 80 MMHG | SYSTOLIC BLOOD PRESSURE: 120 MMHG

## 2023-06-22 DIAGNOSIS — I47.1 SVT (SUPRAVENTRICULAR TACHYCARDIA) (HCC): ICD-10-CM

## 2023-06-22 PROCEDURE — 93000 ELECTROCARDIOGRAM COMPLETE: CPT | Performed by: INTERNAL MEDICINE

## 2023-06-22 PROCEDURE — 99203 OFFICE O/P NEW LOW 30 MIN: CPT | Performed by: INTERNAL MEDICINE

## 2023-06-22 RX ORDER — IVERMECTIN 10 MG/G
CREAM TOPICAL PRN
COMMUNITY

## 2023-06-22 RX ORDER — ASPIRIN 325 MG
TABLET ORAL
COMMUNITY

## 2023-06-22 RX ORDER — DIPHENHYDRAMINE HCL 25 MG
CAPSULE ORAL PRN
COMMUNITY

## 2023-06-22 RX ORDER — CALCIUM CARBONATE/VITAMIN D3 500-10/5ML
LIQUID (ML) ORAL
COMMUNITY

## 2023-06-22 ASSESSMENT — PATIENT HEALTH QUESTIONNAIRE - PHQ9
SUM OF ALL RESPONSES TO PHQ QUESTIONS 1-9: 0
2. FEELING DOWN, DEPRESSED OR HOPELESS: 0
SUM OF ALL RESPONSES TO PHQ9 QUESTIONS 1 & 2: 0
SUM OF ALL RESPONSES TO PHQ QUESTIONS 1-9: 0
1. LITTLE INTEREST OR PLEASURE IN DOING THINGS: 0

## 2023-06-22 NOTE — PROGRESS NOTES
Cardiac Electrophysiology OFFICE Consultation Note     Primary Cardiologist: Dr. Kowalski Mock    Assessment/Plan:   1. SVT (supraventricular tachycardia) (HCC)  -     EKG 12 Lead       SVT  History of palpitations event monitor demonstrated PACs burden of 4.4% and several runs of SVT consistent with atrial tachycardia. - Work-up included normal echocardiogram with preserved LVEF  - Stress test without any evidence of blockages  - Reassurance was provided  - He does have PACs and runs of AT which could be precursor for atrial fibrillation, at this point there is no further indication for invasive management  - Recommend continue metoprolol succinate 50 mg daily  - Emphasized importance of risk factor modification  - Continue to follow-up with Dr. Treasure Rojas  - Follow-up with EP as needed    Subjective:       Lor Mercedes is a 48 y.o. patient who is seen for evaluation of  SVT. History of SVT in 4/2023 with HR to 160 bpm.  3-day event monitor demonstrated PACs burden of 4.36%, rare PVCs of less than 0.01%, SVT (54 episodes), longest 57 beats, consistent with AT. Has occasional palpitations with heart rate up to 160 bpm.  On metoprolol 50 mg daily. Has had negative echocardiogram and stress test.    EKG today demonstrated normal sinus rhythm, rate 69 bpm, normal axis and intervals. I independently review internal and external records of most recent labs, EKGs, event monitors or Holters, and imaging including most recent echocardiograms and stress test.   Ordered follow up testing as indicated in orders, patient instructions. Previous notes from consultants and PCP are reviewed as well as pertinent prior admission documents.     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

## 2023-07-10 ENCOUNTER — OFFICE VISIT (OUTPATIENT)
Age: 53
End: 2023-07-10
Payer: COMMERCIAL

## 2023-07-10 VITALS
DIASTOLIC BLOOD PRESSURE: 62 MMHG | HEIGHT: 70 IN | OXYGEN SATURATION: 99 % | RESPIRATION RATE: 16 BRPM | TEMPERATURE: 97.9 F | WEIGHT: 185.4 LBS | BODY MASS INDEX: 26.54 KG/M2 | HEART RATE: 71 BPM | SYSTOLIC BLOOD PRESSURE: 94 MMHG

## 2023-07-10 DIAGNOSIS — G43.909 MIXED MIGRAINE AND MUSCLE CONTRACTION HEADACHE: Primary | ICD-10-CM

## 2023-07-10 DIAGNOSIS — Z86.19 HISTORY OF SHINGLES: ICD-10-CM

## 2023-07-10 DIAGNOSIS — G44.209 MIXED MIGRAINE AND MUSCLE CONTRACTION HEADACHE: Primary | ICD-10-CM

## 2023-07-10 DIAGNOSIS — I47.1 SVT (SUPRAVENTRICULAR TACHYCARDIA) (HCC): ICD-10-CM

## 2023-07-10 DIAGNOSIS — G43.009 MIGRAINE WITHOUT AURA, NOT INTRACTABLE, WITHOUT STATUS MIGRAINOSUS: ICD-10-CM

## 2023-07-10 PROCEDURE — 99214 OFFICE O/P EST MOD 30 MIN: CPT | Performed by: PSYCHIATRY & NEUROLOGY

## 2023-07-10 RX ORDER — TOPIRAMATE 50 MG/1
50 TABLET, FILM COATED ORAL 2 TIMES DAILY
Qty: 180 TABLET | Refills: 3 | Status: SHIPPED | OUTPATIENT
Start: 2023-07-10

## 2023-07-10 ASSESSMENT — PATIENT HEALTH QUESTIONNAIRE - PHQ9
1. LITTLE INTEREST OR PLEASURE IN DOING THINGS: 0
SUM OF ALL RESPONSES TO PHQ QUESTIONS 1-9: 0
2. FEELING DOWN, DEPRESSED OR HOPELESS: 0
SUM OF ALL RESPONSES TO PHQ QUESTIONS 1-9: 0
SUM OF ALL RESPONSES TO PHQ9 QUESTIONS 1 & 2: 0

## 2023-07-10 NOTE — ASSESSMENT & PLAN NOTE
Remains on metoprolol which can also be used for headache and migraine prevention but patient still with high degree of migraines    Patient is to follow-up with cardiology as appropriate

## 2023-07-10 NOTE — PATIENT INSTRUCTIONS
As per discussion I would like to get you restarted on the Topamax  I sent a prescription to your pharmacy for 50 mg twice a day however I want you to take it as follows    Week 1: Take 1/2 tablet daily  Week 2: Take 1 tablet/day  Week 3: Take 1.5 tablets daily you can either take it together or do it as a split dose taking 1 in the morning and a half in the evening  And week 4: Take 2 full tablets per day again you can split it taking 1 in the morning 1 at bedtime or you can take both at bedtime    Once you are established on full dosing let me know how you are doing in about 6 weeks through [email or web address]    If you have side effects I will switch you over to the Trokendi but generally speaking with a slow titration patients do pretty well with side effects    Continue with the rizatriptan and the ibuprofen for rescue and as we talked about you may have wanted taken ibuprofen before you go out to mow the lawn to see if that helps reduce your headache        Office Policies    Phone calls/patient messages:  Please allow up to 24 hours for someone in the office to contact you about your call or message. Be mindful your provider may be out of the office or your message may require further review. We encourage you to use [email or web address] for your messages as this is a faster, more efficient way to communicate with our office    Medication Refills:  Prescription medications require up to 48 business hours to process. We encourage you to use [email or web address] for your refills. For controlled medications: Please allow up to 72 business hours to process. Certain medications may require you to  a written prescription at our office. NO narcotic/controlled medications will be prescribed after 4pm Monday through Friday or on weekends    Form/Paperwork Completion:  We ask that you allow 7-14 business days. You may also download your forms to [email or web address] to have your doctor print off.

## 2023-07-10 NOTE — PROGRESS NOTES
[address]  In Office FOLLOW-UP VISIT         Miller Saint is a 48 y.o.  male who presents today for the following:  Chief Complaint   Patient presents with    Follow-up     Follow up on migraines and states that he has been getting quite a few and in the last 6 months he kept a record of how many migraines 8,2,8,11,11,7 this is for each month           ASSESSMENT AND PLAN  1. Mixed migraine and muscle contraction headache  Assessment & Plan:   Stemming from cervicogenic headaches due to cervical stenosis    At some point we may need to repeat his cervical MRI scan last one was done in 2019    But for right now we are starting back on the topiramate in addition to the Emgality so hopefully this will calm his HAs down    If not we may look to repeat MRI scan of the cervical spine and possibly of the head    Continue with the use of ibuprofen 600 mg for the cervicogenic/muscle contraction part of the headache and continue with Maxalt for the migrainous  portion of the headache as rescue therapy  2. Migraine without aura, not intractable, without status migrainosus  Assessment & Plan:   Patient has had an uptick in his migraines over the past year  He did well with the combination of Emgality and topiramate  I will get him restarted on the topiramate titrating him up to 100 mg/day   If he has side effects on this we will then switch him to Trokendi which she had been on in the past and did well without any side effects or problems    Rescue protocol has been effective he will continue on Maxalt and ibuprofen 600 mg as needed    If headaches do not improve we will look to repeat MRI scan of the brain  3.  SVT (supraventricular tachycardia) ([facility])  Comments:  Was seen by cardiology had full work-up noticed to be on metoprolol at this time  Assessment & Plan:   Remains on metoprolol which can also be used for headache and migraine prevention but patient still with high degree of

## 2023-07-10 NOTE — ASSESSMENT & PLAN NOTE
Patient has had an uptick in his migraines over the past year  He did well with the combination of Emgality and topiramate  I will get him restarted on the topiramate titrating him up to 100 mg/day   If he has side effects on this we will then switch him to Trokendi which she had been on in the past and did well without any side effects or problems    Rescue protocol has been effective he will continue on Maxalt and ibuprofen 600 mg as needed    If headaches do not improve we will look to repeat MRI scan of the brain

## 2023-07-10 NOTE — ASSESSMENT & PLAN NOTE
Stemming from cervicogenic headaches due to cervical stenosis    At some point we may need to repeat his cervical MRI scan last one was done in 2019    But for right now we are starting back on the topiramate in addition to the Emgality so hopefully this will calm his HAs down    If not we may look to repeat MRI scan of the cervical spine and possibly of the head    Continue with the use of ibuprofen 600 mg for the cervicogenic/muscle contraction part of the headache and continue with Maxalt for the migrainous  portion of the headache as rescue therapy

## 2023-07-16 ENCOUNTER — PATIENT MESSAGE (OUTPATIENT)
Age: 53
End: 2023-07-16

## 2023-07-21 ENCOUNTER — TELEPHONE (OUTPATIENT)
Age: 53
End: 2023-07-21

## 2023-07-21 NOTE — TELEPHONE ENCOUNTER
Patient has been experiencing low blood pressure 92/61, 96/68,97/71,101/67,87/63,91/64,103/74,88/62 for the last days. Patient on metoprolol 50 mg a day. Please advise. 59

## 2023-07-21 NOTE — TELEPHONE ENCOUNTER
Patient should drink lots of water, and can decrease metoprolol to half tablet of 50 mg twice a day. Report back to us if still with blood pressures less than 95 or other concerns.

## 2023-08-03 NOTE — PROGRESS NOTES
[address] [place] [address]  [phone]    [address] [place] [address], [address] [place] [address]     Subjective:        Rafita Sarkar is a 48 y.o. male is here for symptom based visit. Pmhx HTN, HLD, SVT, migraine and GERD. Last seen by Dr Guerrero Valverde in 4/2023 and Dr Jorge Lu in 5/2023     3-day event monitor 6/2023 demonstrated PACs burden of 4.36%, rare PVCs of less than 0.01%, SVT (54 episodes), longest 57 beats, consistent with AT. He was on toprol xl 50 mg daily    Patient has been experiencing low blood pressure 92/61, 96/68,97/71,101/67,87/63,91/64,103/74,88/62 for the last days. Today    Wt down 30 lbs since April, he cut back on his toprol from 50 to 25 mg daily  BP since 110/60    No recurrent palpitation      The patient denies chest pain/ shortness of breath, orthopnea, PND, LE edema, palpitations, syncope, presyncope or fatigue. Patient Active Problem List    Diagnosis Date Noted    History of shingles 07/10/2023    SVT (supraventricular tachycardia) (HCC) 06/21/2023    Lumbar post-laminectomy syndrome 01/09/2023    Hypercholesteremia     Cervicogenic headache 01/07/2020    Cerebral microvascular disease 01/07/2020    Cervical post-laminectomy syndrome 01/07/2020    Migraine without aura, not intractable, without status migrainosus 01/07/2020    Mixed migraine and muscle contraction headache 01/01/2009      Izabella Moss MD  Past Medical History:   Diagnosis Date    Back pain     GERD (gastroesophageal reflux disease)     Hypercholesteremia     Migraine 2009    Neck pain     PONV (postoperative nausea and vomiting)     Thyroid disease     nodule      Past Surgical History:   Procedure Laterality Date    APPENDECTOMY  1990    CERVICAL LAMINECTOMY  2016    CERVICAL LAMINECTOMY      CHOLECYSTECTOMY      CHOLECYSTECTOMY N/A     CHOLECYSTECTOMY, LAPAROSCOPIC  07/15/2022    lap fernie with Intraoperative cholangiogram with intraoperative interpritation.     CHOLECYSTECTOMY,

## 2023-08-07 ENCOUNTER — TELEPHONE (OUTPATIENT)
Age: 53
End: 2023-08-07

## 2023-08-07 ENCOUNTER — OFFICE VISIT (OUTPATIENT)
Age: 53
End: 2023-08-07
Payer: COMMERCIAL

## 2023-08-07 VITALS
HEART RATE: 81 BPM | HEIGHT: 70 IN | SYSTOLIC BLOOD PRESSURE: 112 MMHG | OXYGEN SATURATION: 100 % | BODY MASS INDEX: 25.22 KG/M2 | DIASTOLIC BLOOD PRESSURE: 62 MMHG | WEIGHT: 176.2 LBS

## 2023-08-07 DIAGNOSIS — I47.1 SUPRAVENTRICULAR TACHYCARDIA (HCC): ICD-10-CM

## 2023-08-07 DIAGNOSIS — I47.1 SVT (SUPRAVENTRICULAR TACHYCARDIA) (HCC): Primary | ICD-10-CM

## 2023-08-07 DIAGNOSIS — E78.2 MIXED HYPERLIPIDEMIA: ICD-10-CM

## 2023-08-07 DIAGNOSIS — K21.9 GASTRO-ESOPHAGEAL REFLUX DISEASE WITHOUT ESOPHAGITIS: ICD-10-CM

## 2023-08-07 DIAGNOSIS — R93.1 ABNORMAL FINDINGS ON DIAGNOSTIC IMAGING OF HEART AND CORONARY CIRCULATION: ICD-10-CM

## 2023-08-07 DIAGNOSIS — R07.2 PRECORDIAL PAIN: ICD-10-CM

## 2023-08-07 PROCEDURE — 99214 OFFICE O/P EST MOD 30 MIN: CPT | Performed by: NURSE PRACTITIONER

## 2023-08-07 PROCEDURE — 93000 ELECTROCARDIOGRAM COMPLETE: CPT | Performed by: NURSE PRACTITIONER

## 2023-08-07 RX ORDER — METOPROLOL SUCCINATE 25 MG/1
25 TABLET, EXTENDED RELEASE ORAL NIGHTLY
Qty: 90 TABLET | Refills: 1
Start: 2023-08-07

## 2023-08-07 ASSESSMENT — PATIENT HEALTH QUESTIONNAIRE - PHQ9
2. FEELING DOWN, DEPRESSED OR HOPELESS: 0
SUM OF ALL RESPONSES TO PHQ QUESTIONS 1-9: 0
SUM OF ALL RESPONSES TO PHQ9 QUESTIONS 1 & 2: 0
1. LITTLE INTEREST OR PLEASURE IN DOING THINGS: 0
SUM OF ALL RESPONSES TO PHQ QUESTIONS 1-9: 0

## 2023-08-07 NOTE — PATIENT INSTRUCTIONS
You will be receiving your Heart Monitor within 5-7 business days, please follow instructions inside box  and call us if

## 2023-08-07 NOTE — TELEPHONE ENCOUNTER
Enrolled with [employer] - Ordered and being shipped to patient's home address on file. ETA within 5-7 business days. Message  Received:  Today  SUKI Moffett has ordered a 1 month Event for DVT's for this patient   Thanks

## 2023-09-26 ENCOUNTER — TELEPHONE (OUTPATIENT)
Age: 53
End: 2023-09-26

## 2023-09-26 NOTE — TELEPHONE ENCOUNTER
----- Message from GREG Thomas NP sent at 9/24/2023  1:33 PM EDT -----  Monitor showed normal sinus rhythm,  occasional early heartbeats from the upper chambers of the heart. Continue lowered dose of metoprolol.     Left msge on voice mail to call office back at [phone]

## 2023-09-27 ENCOUNTER — TELEPHONE (OUTPATIENT)
Age: 53
End: 2023-09-27

## 2023-09-27 NOTE — TELEPHONE ENCOUNTER
----- Message from GREG Amezquita NP sent at 9/24/2023  1:33 PM EDT -----  Monitor showed normal sinus rhythm,  occasional early heartbeats from the upper chambers of the heart. Continue lowered dose of metoprolol. Spoke with patient, verified with 2 identifiers, regarding results and recommendations. Mr. Taz Molina stated taking Metoprolol Succ 25 mg twice a day since he noticed his heart raising more. Noted that metoprolol was adjusted to 25 mg daily since he was having Low BP. BP within normal limits, patient stated. Dr. Isaiah Acuna will be inform.   Patient has an appointment in October with MD.

## 2023-10-17 ENCOUNTER — OFFICE VISIT (OUTPATIENT)
Age: 53
End: 2023-10-17
Payer: COMMERCIAL

## 2023-10-17 VITALS
WEIGHT: 173 LBS | HEART RATE: 73 BPM | SYSTOLIC BLOOD PRESSURE: 100 MMHG | HEIGHT: 70 IN | BODY MASS INDEX: 24.77 KG/M2 | OXYGEN SATURATION: 96 % | DIASTOLIC BLOOD PRESSURE: 60 MMHG | RESPIRATION RATE: 16 BRPM

## 2023-10-17 DIAGNOSIS — I47.10 SVT (SUPRAVENTRICULAR TACHYCARDIA): Primary | ICD-10-CM

## 2023-10-17 DIAGNOSIS — E78.2 MIXED HYPERLIPIDEMIA: ICD-10-CM

## 2023-10-17 DIAGNOSIS — G43.009 MIGRAINE WITHOUT AURA, NOT INTRACTABLE, WITHOUT STATUS MIGRAINOSUS: ICD-10-CM

## 2023-10-17 PROCEDURE — 99214 OFFICE O/P EST MOD 30 MIN: CPT | Performed by: INTERNAL MEDICINE

## 2023-10-17 PROCEDURE — 93000 ELECTROCARDIOGRAM COMPLETE: CPT | Performed by: INTERNAL MEDICINE

## 2023-10-17 RX ORDER — METOPROLOL SUCCINATE 50 MG/1
25 TABLET, EXTENDED RELEASE ORAL 2 TIMES DAILY
COMMUNITY
Start: 2023-09-27

## 2023-10-17 RX ORDER — CHLORHEXIDINE GLUCONATE ORAL RINSE 1.2 MG/ML
SOLUTION DENTAL
COMMUNITY
Start: 2023-10-13

## 2023-10-17 RX ORDER — AMOXICILLIN AND CLAVULANATE POTASSIUM 875; 125 MG/1; MG/1
TABLET, FILM COATED ORAL
COMMUNITY
Start: 2023-10-13

## 2023-10-17 ASSESSMENT — PATIENT HEALTH QUESTIONNAIRE - PHQ9
SUM OF ALL RESPONSES TO PHQ QUESTIONS 1-9: 0
SUM OF ALL RESPONSES TO PHQ9 QUESTIONS 1 & 2: 0
2. FEELING DOWN, DEPRESSED OR HOPELESS: 0
SUM OF ALL RESPONSES TO PHQ QUESTIONS 1-9: 0
1. LITTLE INTEREST OR PLEASURE IN DOING THINGS: 0

## 2023-10-17 NOTE — PROGRESS NOTES
07/15/2022    GFRAA >60 07/15/2022    AGRATIO 0.9 (L) 07/15/2022    GLOB 3.9 07/15/2022       INR  No results found for: \"INR\", \"PROTIME\"     LIPIDS  No results found for: \"LIPIDPAN\"      Lipids No results found for: \"CHOL\", \"TRIG\", \"HDL\", \"LDLCALC\", \"LABVLDL\", \"CHOLHDLRATIO\"      ECHO:  07/08/22    TRANSTHORACIC ECHOCARDIOGRAM (TTE) COMPLETE (CONTRAST/BUBBLE/3D PRN) 07/09/2022  4:18 PM, 07/09/2022 12:00 AM (Final)    Narrative  This is a summary report. The complete report is available in the patient's medical record. If you cannot access the medical record, please contact the sending organization for a detailed fax or copy. Formatting of this result is different from the original.      Left Ventricle: Normal left ventricular systolic function with a visually estimated EF of 55 - 60%. Left ventricle size is normal. Normal wall thickness. Normal wall motion. Normal diastolic function. Signed by: Celeste Martinez MD on 7/9/2022  4:18 PM, Signed by: Unknown Provider Result on 7/9/2022 12:00 AM      STRESS:  No results found for this or any previous visit. CARDIAC CATH  No results found for this or any previous visit. EKG:  NSR with 1 premature atrial contraction     Assessment:         Diagnosis Orders   1. SVT (supraventricular tachycardia)  EKG 12 Lead      2. Mixed hyperlipidemia        3. Migraine without aura, not intractable, without status migrainosus            Orders Placed This Encounter   Procedures    EKG 12 Lead     Order Specific Question:   Reason for Exam?     Answer: Other        Plan:     Precordial chest pain, with stress echo revealing SVT at a low level of exercise while off of propranolol:   Coronary calcium score of 0 is comforting, with a very high negative predictive value. Discussed with patient.    Stress echo 4/2022 was normal   Sebastien duran 7/2022    Stable     Supraventricular tachycardia:  History of SVT in 4/2023 with HR to 160 bpm.    -LVEF 55-60% valves ok

## 2024-01-04 ENCOUNTER — PATIENT MESSAGE (OUTPATIENT)
Age: 54
End: 2024-01-04

## 2024-01-04 DIAGNOSIS — I47.10 SUPRAVENTRICULAR TACHYCARDIA: ICD-10-CM

## 2024-01-05 RX ORDER — METOPROLOL SUCCINATE 25 MG/1
25 TABLET, EXTENDED RELEASE ORAL NIGHTLY
Qty: 90 TABLET | Refills: 2 | Status: SHIPPED | OUTPATIENT
Start: 2024-01-05

## 2024-01-05 NOTE — TELEPHONE ENCOUNTER
Refill Request Received for the Following Medication     Requested Prescriptions     Pending Prescriptions Disp Refills    metoprolol succinate (TOPROL XL) 25 MG extended release tablet 90 tablet 1     Sig: Take 1 tablet by mouth nightly       Last Prescribed:08-    Last Appointment With Me:  10/17/2023     Future Appointments:  Future Appointments   Date Time Provider Department Center   3/11/2024  9:00 AM Latosha Pérez ANP [facility] BS AMB   10/21/2024 10:20 AM Humberto Gonzalez MD BS BS AMB

## 2024-01-29 ENCOUNTER — HOSPITAL ENCOUNTER (OUTPATIENT)
Facility: HOSPITAL | Age: 54
Discharge: HOME | End: 2024-02-01
Payer: COMMERCIAL

## 2024-01-29 DIAGNOSIS — K76.9 HEPATIC LESION: ICD-10-CM

## 2024-01-29 DIAGNOSIS — R10.11 RUQ ABDOMINAL PAIN: ICD-10-CM

## 2024-01-29 PROCEDURE — 74150 CT ABDOMEN W/O CONTRAST: CPT

## 2024-02-15 ENCOUNTER — TRANSCRIBE ORDERS (OUTPATIENT)
Facility: HOSPITAL | Age: 54
End: 2024-02-15

## 2024-02-15 DIAGNOSIS — R93.3 ABNORMAL FINDING ON GI TRACT IMAGING: Primary | ICD-10-CM

## 2024-02-22 ENCOUNTER — HOSPITAL ENCOUNTER (OUTPATIENT)
Facility: HOSPITAL | Age: 54
End: 2024-02-22
Payer: COMMERCIAL

## 2024-02-22 ENCOUNTER — HOSPITAL ENCOUNTER (OUTPATIENT)
Facility: HOSPITAL | Age: 54
Discharge: HOME | End: 2024-02-22
Payer: COMMERCIAL

## 2024-02-22 DIAGNOSIS — R93.3 ABNORMAL FINDING ON GI TRACT IMAGING: ICD-10-CM

## 2024-02-22 PROCEDURE — 6360000004 HC RX CONTRAST MEDICATION: Performed by: STUDENT IN AN ORGANIZED HEALTH CARE EDUCATION/TRAINING PROGRAM

## 2024-02-22 PROCEDURE — 72197 MRI PELVIS W/O & W/DYE: CPT

## 2024-02-22 PROCEDURE — 74183 MRI ABD W/O CNTR FLWD CNTR: CPT

## 2024-02-22 PROCEDURE — A9575 INJ GADOTERATE MEGLUMI 0.1ML: HCPCS | Performed by: STUDENT IN AN ORGANIZED HEALTH CARE EDUCATION/TRAINING PROGRAM

## 2024-02-22 RX ORDER — GADOTERATE MEGLUMINE 376.9 MG/ML
15 INJECTION INTRAVENOUS
Status: COMPLETED | OUTPATIENT
Start: 2024-02-22 | End: 2024-02-22

## 2024-02-22 RX ADMIN — GADOTERATE MEGLUMINE 15 ML: 376.9 INJECTION INTRAVENOUS at 20:17

## 2024-03-11 ENCOUNTER — OFFICE VISIT (OUTPATIENT)
Age: 54
End: 2024-03-11
Payer: COMMERCIAL

## 2024-03-11 VITALS
HEART RATE: 93 BPM | RESPIRATION RATE: 16 BRPM | OXYGEN SATURATION: 99 % | WEIGHT: 170.8 LBS | DIASTOLIC BLOOD PRESSURE: 62 MMHG | BODY MASS INDEX: 24.45 KG/M2 | SYSTOLIC BLOOD PRESSURE: 98 MMHG | TEMPERATURE: 99.1 F | HEIGHT: 70 IN

## 2024-03-11 DIAGNOSIS — G43.909 MIXED MIGRAINE AND MUSCLE CONTRACTION HEADACHE: Primary | ICD-10-CM

## 2024-03-11 DIAGNOSIS — G44.209 MIXED MIGRAINE AND MUSCLE CONTRACTION HEADACHE: Primary | ICD-10-CM

## 2024-03-11 DIAGNOSIS — G43.009 MIGRAINE WITHOUT AURA, NOT INTRACTABLE, WITHOUT STATUS MIGRAINOSUS: ICD-10-CM

## 2024-03-11 PROCEDURE — 99214 OFFICE O/P EST MOD 30 MIN: CPT | Performed by: PSYCHIATRY & NEUROLOGY

## 2024-03-11 RX ORDER — MAGNESIUM 30 MG
30 TABLET ORAL 2 TIMES DAILY
COMMUNITY

## 2024-03-11 NOTE — PATIENT INSTRUCTIONS
As per discussion  Overall you are doing well I would not recommend any changes in treatment or intervention at this time    Your headaches are coming back let me know and we will reinitiate the Emgality.    Also if you know that mowing the lawn tends to trigger your headaches and migraines that is probably induced to some of the standard allergens in the air related to that so you may want to consider starting Zyrtec or like product [without the D component] about 2 weeks before lawnmowing season kicks in and can continue to take it on a daily basis through lawnmowing season to help down regulate any type of seasonal reactions that may trigger off your headaches.    You can buy that over-the-counter    In the meantime, I hope you have a very happy birthday!        Office Policies    Phone calls/patient messages:  Please allow up to 24 hours for someone in the office to contact you about your call or message. Be mindful your provider may be out of the office or your message may require further review. We encourage you to use [employer] for your messages as this is a faster, more efficient way to communicate with our office    Medication Refills:  Prescription medications require up to 48 business hours to process. We encourage you to use [employer] for your refills.     For controlled medications: Please allow up to 72 business hours to process. Certain medications may require you to  a written prescription at our office.    NO narcotic/controlled medications will be prescribed after 4pm Monday through Friday or on weekends    Form/Paperwork Completion:  We ask that you allow 7-14 business days. You may also download your forms to [employer] to have your doctor print off.

## 2024-03-11 NOTE — ASSESSMENT & PLAN NOTE
Much improved once his dental work had been addressed and infections resolved presently he is just on Toprol 25 mg/day and using rizatriptan as needed

## 2024-03-11 NOTE — PROGRESS NOTES
[facility]  [facility]  [address]  [place] [address]  Phone: [phone] fax: [phone]          Kenton Melendez is a 53 y.o.  male who presents today for the following:  Chief Complaint   Patient presents with    Follow-up     Patient states that he is 2 to 4 a month           ASSESSMENT AND PLAN  1. Mixed migraine and muscle contraction headache  Assessment & Plan:  Migraines much improved he is just maintained on low-dose Toprol 25 mg daily and using rizatriptan as needed.     He ended up having some dental work completed which seems to have significantly improved his migraines once that was taken care of and the infections healed   2. Migraine without aura, not intractable, without status migrainosus  Assessment & Plan:  Much improved once his dental work had been addressed and infections resolved presently he is just on Toprol 25 mg/day and using rizatriptan as needed         Patient and/or family was given time to ask questions and voice concerns. I believe all questions concerns were adequately addressed at this  office visit.  Patient and/or family also verbalized agreement and understanding of the above-stated plan    Complex neurologic decision making secondary any or all of the following to include unclear etiology, and /or polypharmacy, and/or significant comorbid conditions, and/or use of high-risk medications which complicate the decision making process related to patient's neurologic diagnosis    Return in about 1 year (around 3/11/2025) for In office.       ICD-10-CM    1. Mixed migraine and muscle contraction headache  G43.909     G44.209       2. Migraine without aura, not intractable, without status migrainosus  G43.009               HPI  Historical Data  Patient is previously been seen by Dr. Corona     Neurologic diagnosis:  Headaches and migraines             Location: Bitemporal, frontal in the back of his head

## 2024-03-11 NOTE — ASSESSMENT & PLAN NOTE
Migraines much improved he is just maintained on low-dose Toprol 25 mg daily and using rizatriptan as needed.     He ended up having some dental work completed which seems to have significantly improved his migraines once that was taken care of and the infections healed

## 2024-03-20 NOTE — ANESTHESIA PREPROCEDURE EVALUATION
Relevant Problems   NEUROLOGY   (+) Cervicogenic headache   (+) Migraine   (+) Mixed common migraine and muscle contraction headache       Anesthetic History     PONV          Review of Systems / Medical History  Patient summary reviewed and pertinent labs reviewed    Pulmonary  Within defined limits                 Neuro/Psych         Headaches     Cardiovascular              Hyperlipidemia    Exercise tolerance: >4 METS     GI/Hepatic/Renal     GERD           Endo/Other      Hypothyroidism       Other Findings   Comments: Back and neck pain-both have been operated on         Physical Exam    Airway  Mallampati: II  TM Distance: 4 - 6 cm  Neck ROM: decreased range of motion   Mouth opening: Normal     Cardiovascular  Regular rate and rhythm,  S1 and S2 normal,  no murmur, click, rub, or gallop             Dental  No notable dental hx       Pulmonary  Breath sounds clear to auscultation               Abdominal  GI exam deferred       Other Findings            Anesthetic Plan    ASA: 2  Anesthesia type: general    Monitoring Plan: BIS      Induction: Intravenous  Anesthetic plan and risks discussed with: Patient      ponv-scope patch Normal for race

## 2024-05-02 ENCOUNTER — HOSPITAL ENCOUNTER (OUTPATIENT)
Facility: HOSPITAL | Age: 54
Discharge: HOME | End: 2024-05-02
Payer: COMMERCIAL

## 2024-05-02 ENCOUNTER — TRANSCRIBE ORDERS (OUTPATIENT)
Facility: HOSPITAL | Age: 54
End: 2024-05-02

## 2024-05-02 DIAGNOSIS — R07.89 OTHER CHEST PAIN: Primary | ICD-10-CM

## 2024-05-02 DIAGNOSIS — R07.89 OTHER CHEST PAIN: ICD-10-CM

## 2024-05-02 PROCEDURE — 71046 X-RAY EXAM CHEST 2 VIEWS: CPT

## 2024-07-07 DIAGNOSIS — I47.10 SUPRAVENTRICULAR TACHYCARDIA (HCC): ICD-10-CM

## 2024-07-08 RX ORDER — METOPROLOL SUCCINATE 25 MG/1
25 TABLET, EXTENDED RELEASE ORAL NIGHTLY
Qty: 90 TABLET | Refills: 0 | Status: SHIPPED | OUTPATIENT
Start: 2024-07-08

## 2024-07-08 NOTE — TELEPHONE ENCOUNTER
Refill Request Received for the Following Medication     Requested Prescriptions     Pending Prescriptions Disp Refills    metoprolol succinate (TOPROL XL) 25 MG extended release tablet [Pharmacy Med Name: METOPROLOL SUCC ER 25 MG TAB] 90 tablet 2     Sig: TAKE 1 TABLET BY MOUTH EVERY DAY AT NIGHT       Last Prescribed: 01-    Last Appointment With Me:  10/17/2023     Future Appointments:  Future Appointments   Date Time Provider Department Center   10/21/2024 10:20 AM Humberto Gonzalez MD BS BS AMB   3/11/2025 10:00 AM Latosha Pérez ANP [facility] BS AMB

## 2024-11-20 DIAGNOSIS — M96.1 POSTLAMINECTOMY SYNDROME, NOT ELSEWHERE CLASSIFIED: ICD-10-CM

## 2024-11-20 DIAGNOSIS — G44.86 CERVICOGENIC HEADACHE: ICD-10-CM

## 2024-11-20 NOTE — TELEPHONE ENCOUNTER
Last office visit: 03/11/2024 with Latosha  Next office visit: 03/11/2025 with Latosha   Last med refill: 05/19/2023

## 2024-11-21 RX ORDER — RIZATRIPTAN BENZOATE 10 MG/1
TABLET ORAL
Qty: 12 TABLET | Refills: 11 | Status: SHIPPED | OUTPATIENT
Start: 2024-11-21

## 2025-01-08 DIAGNOSIS — I47.10 SUPRAVENTRICULAR TACHYCARDIA (HCC): ICD-10-CM

## 2025-01-08 RX ORDER — METOPROLOL SUCCINATE 25 MG/1
25 TABLET, EXTENDED RELEASE ORAL NIGHTLY
Qty: 90 TABLET | Refills: 3 | Status: SHIPPED | OUTPATIENT
Start: 2025-01-08

## 2025-01-08 NOTE — TELEPHONE ENCOUNTER
Refill Request Received for the Following Medication     Requested Prescriptions     Pending Prescriptions Disp Refills    metoprolol succinate (TOPROL XL) 25 MG extended release tablet [Pharmacy Med Name: METOPROLOL SUCC ER 25 MG TAB] 90 tablet 0     Sig: TAKE 1 TABLET BY MOUTH EVERY DAY AT NIGHT       Last Prescribed:07-    Last Appointment With Me:  10/21/2024     Future Appointments:  Future Appointments   Date Time Provider Department Center   3/11/2025 10:00 AM Latosha Pérez ANP [facility] BS AMB   10/21/2025 10:20 AM Humberto Gonzalez MD [facility] BS AMB

## 2025-03-11 ENCOUNTER — OFFICE VISIT (OUTPATIENT)
Age: 55
End: 2025-03-11
Payer: COMMERCIAL

## 2025-03-11 VITALS
RESPIRATION RATE: 15 BRPM | BODY MASS INDEX: 26.4 KG/M2 | HEIGHT: 70 IN | TEMPERATURE: 98 F | SYSTOLIC BLOOD PRESSURE: 110 MMHG | WEIGHT: 184.4 LBS | OXYGEN SATURATION: 98 % | DIASTOLIC BLOOD PRESSURE: 79 MMHG | HEART RATE: 78 BPM

## 2025-03-11 DIAGNOSIS — G44.209 MIXED MIGRAINE AND MUSCLE CONTRACTION HEADACHE: Primary | ICD-10-CM

## 2025-03-11 DIAGNOSIS — G43.909 MIXED MIGRAINE AND MUSCLE CONTRACTION HEADACHE: Primary | ICD-10-CM

## 2025-03-11 PROCEDURE — 99213 OFFICE O/P EST LOW 20 MIN: CPT | Performed by: PSYCHIATRY & NEUROLOGY

## 2025-03-11 NOTE — PROGRESS NOTES
Kenton Melendez is a 54 y.o. male    Chief Complaint   Patient presents with    Annual Exam     Vitals:    03/11/25 0949   BP: 110/79   BP Site: Left Upper Arm   Patient Position: Sitting   Pulse: 78   Resp: 15   Temp: 98 °F (36.7 °C)   SpO2: 98%   Weight: 83.6 kg (184 lb 6.4 oz)   Height: 1.778 m (5' 10\")         Health Maintenance Due   Topic Date Due    HIV screen  Never done    Hepatitis C screen  Never done    Hepatitis B vaccine (1 of 3 - 19+ 3-dose series) Never done    Lipids  Never done    Colorectal Cancer Screen  Never done    Shingles vaccine (1 of 2) Never done    Pneumococcal 50+ years Vaccine (1 of 1 - PCV) Never done    DTaP/Tdap/Td vaccine (2 - Td or Tdap) 01/01/2023    Flu vaccine (1) Never done    COVID-19 Vaccine (3 - 2024-25 season) 09/01/2024         \"Have you been to the ER, urgent care clinic since your last visit?  Hospitalized since your last visit?\"    NO    “Have you seen or consulted any other health care providers outside of [facility] since your last visit?”    NO    “Have you had a colorectal cancer screening such as a colonoscopy/FIT/Cologuard?    YES    No colonoscopy on file  No cologuard on file  No FIT/FOBT on file   No flexible sigmoidoscopy on file           
[facility]  [facility]  [address]  [place] [address]  Phone: [phone] fax: [phone]          Kenton Melendez is a 54 y.o.  male who presents today for the following:  No chief complaint on file.          ASSESSMENT AND PLAN  {There are no diagnoses linked to this encounter. (Refresh or delete this SmartLink)}        Patient and/or family was given time to ask questions and voice concerns. I believe all questions concerns were adequately addressed at this  office visit.  Patient and/or family also verbalized agreement and understanding of the above-stated plan    Complex neurologic decision making secondary any or all of the following to include unclear etiology, and /or polypharmacy, and/or significant comorbid conditions, and/or use of high-risk medications which complicate the decision making process related to patient's neurologic diagnosis    No follow-ups on file.     No diagnosis found.          HPI  Historical Data  Patient is previously been seen by Dr. Corona     Neurologic diagnosis:  Headaches and migraines             Location: Bitemporal, frontal in the back of his head             Quality: Pressure/throbbing             Associated symptoms: Nausea occasional vomiting     Preventative therapies tried  Propranolol  Topamax  Trokendi  Cymbalta  Emgality  Amitriptyline  Metoprolol     Rescue medications:  Maxalt  Ibuprofen 800 mg as needed     Duplex studies completed January 9, 2020: 0% stenosis bilateral carotid artery and normal vertebral anterograde flow     MRI of the brain from December 19, 2020 shows minimal [less than 5] scattered nonspecific T2 flair white matter hyper intensities felt to be of no clinical significance otherwise unremarkable     Other significant comorbid conditions  Cervical fusion x3   Arthroscopic left shoulder surgery June 2021    SVT with PACs and AT   Seen by cardiology 6/22/2023   Recommend 
extended release tablet TAKE 1 TABLET BY MOUTH EVERY DAY AT NIGHT 90 tablet 3    rizatriptan (MAXALT) 10 MG tablet TAKE 1 TABLET BY MOUTH EVERY EIGHT (8) HOURS AS NEEDED FOR MIGRAINE. MAY REPEAT IN 2 HOURS IF NEEDED 12 tablet 11    magnesium 30 MG tablet Take 1 tablet by mouth 2 times daily      Ivermectin 1 % CREA as needed      Multiple Vitamins-Minerals (MULTIVITAMIN GUMMIES ADULT) CHEW Take by mouth      ibuprofen (ADVIL;MOTRIN) 600 MG tablet Take 1 tablet by mouth every 6 hours as needed       No current facility-administered medications for this visit.           Past medical history/surgical history, family history, and social history have been reviewed for today's visit      ROS    A ten system review of constitutional, cardiovascular, respiratory, musculoskeletal, endocrine, skin, SHEENT, genitourinary, psychiatric and neurologic systems was obtained and is unremarkable except as mentioned under HPI          EXAMINATION:     Visit Vitals  Vitals:    03/11/25 0949   BP: 110/79   BP Site: Left Upper Arm   Patient Position: Sitting   Pulse: 78   Resp: 15   Temp: 98 °F (36.7 °C)   SpO2: 98%   Weight: 83.6 kg (184 lb 6.4 oz)   Height: 1.778 m (5' 10\")            General appearance: Patient is well-developed and well-nourished in no apparent distress and well groomed.    Psych/mental health:  Affect: Appropriate    PHQ  No data recorded        HEENT:   Normocephalic  With evidence of trauma: No  Full range of motion head neck: Yes  Tenderness to palpation of the head neck region: Increased tone left trapezius muscle      Cardiovascular:     Extremities warm to touch: Yes  Extremity swelling: No  Discoloration: No  Evidence of PVD: No    Respiratory:   Dyspnea on exertion: No   Abnormal effort on casual observation: No   Use of portable oxygen: No   Evidence of cyanosis: No     Musculoskeletal:   Evidence of significant bone deformities: No   Spinal curvature: No     Integumentary:    Obvious bruising: No

## 2025-03-11 NOTE — PATIENT INSTRUCTIONS
Patient Information:  Name: Kenton Melendez  Age: 54  Medical History: History of headaches and migraines currently on metoprolol for heart rhythm management.    Reason for Visit:  Kenton visited to evaluate his headaches and migraines. He reported that his headaches were more frequent in November and December but have decreased significantly since February. He experiences more severe headaches on the right side, which sometimes require a second dose of rizatriptan.    .    Diagnosis:  - Headaches and migraines    Treatment Plan:  - Continue current medication regimen (rizatriptan, ibuprofen, metoprolol, MultiVites) without any changes.  - Maintain a regular stretching routine for 10 minutes daily.  - Ensure enough refills on rizatriptan to last through November 2025.    Additional Notes:  - The patient was reassured that the theoretical interaction between rizatriptan and metoprolol is not a clinical concern.  - The patient was advised to keep a backstock of rizatriptan in case of increased headache frequency.  - The patient was informed about the pathophysiology of migraines and the potential role of neck issues.  -Check with primary care if they are willing to take over the prescription arise and you do not need to be seen back in the office and you can just continue to get the prescriptions through primary care.  However if they are not willing to take over the prescription then we will need to see you back annually.      As a reminder:   Please come to your appointment 15 minutes before your office appointment.  This way, you can get checked in at the  and checked in by the nursing staff so you have the full allotment of time with your provider for your visit.  Please bring an up-to-date and accurate list of all your medications.  Or bring all your active prescription bottles with you at the time of your office visit and this includes over-the-counter medications so we can make sure that your

## 2025-03-11 NOTE — ASSESSMENT & PLAN NOTE
Migraines much improved he is just maintained on low-dose Toprol 25 mg daily and using rizatriptan as needed.  Along with 600 mg of ibuprofen.  He takes the rizatriptan and ibuprofen in combination.  He does occasionally need to redose the rizatriptan.  But that is not often.

## 2025-07-30 ENCOUNTER — TRANSCRIBE ORDERS (OUTPATIENT)
Facility: HOSPITAL | Age: 55
End: 2025-07-30

## 2025-07-30 DIAGNOSIS — R10.11 RUQ PAIN: Primary | ICD-10-CM

## 2025-07-30 DIAGNOSIS — R10.13 DYSPEPSIA: ICD-10-CM

## 2025-07-30 DIAGNOSIS — R19.7 DIARRHEA, UNSPECIFIED TYPE: ICD-10-CM

## 2025-07-30 DIAGNOSIS — K76.0 HEPATIC STEATOSIS: ICD-10-CM

## 2025-07-30 DIAGNOSIS — Z12.11 COLON CANCER SCREENING: ICD-10-CM

## 2025-08-12 ENCOUNTER — HOSPITAL ENCOUNTER (OUTPATIENT)
Facility: HOSPITAL | Age: 55
Discharge: HOME | End: 2025-08-15
Attending: INTERNAL MEDICINE
Payer: COMMERCIAL

## 2025-08-12 DIAGNOSIS — R10.11 RUQ PAIN: ICD-10-CM

## 2025-08-12 DIAGNOSIS — K76.0 HEPATIC STEATOSIS: ICD-10-CM

## 2025-08-12 DIAGNOSIS — Z12.11 COLON CANCER SCREENING: ICD-10-CM

## 2025-08-12 DIAGNOSIS — R10.13 DYSPEPSIA: ICD-10-CM

## 2025-08-12 DIAGNOSIS — R19.7 DIARRHEA, UNSPECIFIED TYPE: ICD-10-CM

## 2025-08-12 PROCEDURE — 76700 US EXAM ABDOM COMPLETE: CPT

## (undated) DEVICE — 4-PORT MANIFOLD: Brand: NEPTUNE 2

## (undated) DEVICE — KIT,1200CC CANISTER,3/16"X6' TUBING: Brand: MEDLINE INDUSTRIES, INC.

## (undated) DEVICE — HYPODERMIC SAFETY NEEDLE: Brand: MAGELLAN

## (undated) DEVICE — CATHETER URET 4FR L70CM POLYUR OLV FLX TIP KINK RESIST W/

## (undated) DEVICE — BLADE ASSEMB CLP HAIR FINE --

## (undated) DEVICE — DECANTER BAG 9": Brand: MEDLINE INDUSTRIES, INC.

## (undated) DEVICE — SYR 10ML LUER LOK 1/5ML GRAD --

## (undated) DEVICE — GENERAL LAPAROSCOPY-MRMC: Brand: MEDLINE INDUSTRIES, INC.

## (undated) DEVICE — VISUALIZATION SYSTEM: Brand: CLEARIFY

## (undated) DEVICE — GLOVE SURG SZ 7.5 L11.2IN THK9.8MIL STRW LTX POLYMER BEAD

## (undated) DEVICE — TROCAR: Brand: KII FIOS FIRST ENTRY

## (undated) DEVICE — SUTURE MCRYL SZ 4-0 L27IN ABSRB UD L19MM PS-2 1/2 CIR PRIM Y426H

## (undated) DEVICE — TROCAR: Brand: KII SLEEVE

## (undated) DEVICE — Device

## (undated) DEVICE — TROCAR ENDOSCP L100MM DIA5MM BLDELSS STBL SL THRD OPT VW

## (undated) DEVICE — SOL INJ SOD CL 0.9% 500ML BG --

## (undated) DEVICE — C-ARM: Brand: UNBRANDED

## (undated) DEVICE — APPLIER CLP M L L11.4IN DIA10MM ENDOSCP ROT MULT FOR LIG